# Patient Record
Sex: MALE | Race: WHITE | NOT HISPANIC OR LATINO | URBAN - METROPOLITAN AREA
[De-identification: names, ages, dates, MRNs, and addresses within clinical notes are randomized per-mention and may not be internally consistent; named-entity substitution may affect disease eponyms.]

---

## 2023-06-18 ENCOUNTER — EMERGENCY (EMERGENCY)
Facility: HOSPITAL | Age: 38
LOS: 0 days | Discharge: HOME | End: 2023-06-18
Attending: EMERGENCY MEDICINE
Payer: COMMERCIAL

## 2023-06-18 VITALS
RESPIRATION RATE: 18 BRPM | SYSTOLIC BLOOD PRESSURE: 129 MMHG | HEART RATE: 80 BPM | TEMPERATURE: 97 F | OXYGEN SATURATION: 98 % | DIASTOLIC BLOOD PRESSURE: 63 MMHG

## 2023-06-18 VITALS
OXYGEN SATURATION: 98 % | WEIGHT: 199.96 LBS | SYSTOLIC BLOOD PRESSURE: 132 MMHG | HEART RATE: 84 BPM | RESPIRATION RATE: 18 BRPM | HEIGHT: 71 IN | TEMPERATURE: 97 F | DIASTOLIC BLOOD PRESSURE: 64 MMHG

## 2023-06-18 DIAGNOSIS — R55 SYNCOPE AND COLLAPSE: ICD-10-CM

## 2023-06-18 DIAGNOSIS — R10.9 UNSPECIFIED ABDOMINAL PAIN: ICD-10-CM

## 2023-06-18 DIAGNOSIS — N50.812 LEFT TESTICULAR PAIN: ICD-10-CM

## 2023-06-18 LAB
ALBUMIN SERPL ELPH-MCNC: 4.7 G/DL — SIGNIFICANT CHANGE UP (ref 3.5–5.2)
ALP SERPL-CCNC: 62 U/L — SIGNIFICANT CHANGE UP (ref 30–115)
ALT FLD-CCNC: 14 U/L — SIGNIFICANT CHANGE UP (ref 0–41)
ANION GAP SERPL CALC-SCNC: 9 MMOL/L — SIGNIFICANT CHANGE UP (ref 7–14)
APPEARANCE UR: CLEAR — SIGNIFICANT CHANGE UP
AST SERPL-CCNC: 19 U/L — SIGNIFICANT CHANGE UP (ref 0–41)
BASOPHILS # BLD AUTO: 0.01 K/UL — SIGNIFICANT CHANGE UP (ref 0–0.2)
BASOPHILS NFR BLD AUTO: 0.2 % — SIGNIFICANT CHANGE UP (ref 0–1)
BILIRUB SERPL-MCNC: 0.7 MG/DL — SIGNIFICANT CHANGE UP (ref 0.2–1.2)
BILIRUB UR-MCNC: NEGATIVE — SIGNIFICANT CHANGE UP
BUN SERPL-MCNC: 10 MG/DL — SIGNIFICANT CHANGE UP (ref 10–20)
CALCIUM SERPL-MCNC: 9.3 MG/DL — SIGNIFICANT CHANGE UP (ref 8.4–10.5)
CHLORIDE SERPL-SCNC: 103 MMOL/L — SIGNIFICANT CHANGE UP (ref 98–110)
CO2 SERPL-SCNC: 26 MMOL/L — SIGNIFICANT CHANGE UP (ref 17–32)
COLOR SPEC: YELLOW — SIGNIFICANT CHANGE UP
CREAT SERPL-MCNC: 0.9 MG/DL — SIGNIFICANT CHANGE UP (ref 0.7–1.5)
DIFF PNL FLD: NEGATIVE — SIGNIFICANT CHANGE UP
EGFR: 113 ML/MIN/1.73M2 — SIGNIFICANT CHANGE UP
EOSINOPHIL # BLD AUTO: 0.19 K/UL — SIGNIFICANT CHANGE UP (ref 0–0.7)
EOSINOPHIL NFR BLD AUTO: 3.1 % — SIGNIFICANT CHANGE UP (ref 0–8)
GLUCOSE SERPL-MCNC: 98 MG/DL — SIGNIFICANT CHANGE UP (ref 70–99)
GLUCOSE UR QL: NEGATIVE MG/DL — SIGNIFICANT CHANGE UP
HCT VFR BLD CALC: 44.1 % — SIGNIFICANT CHANGE UP (ref 42–52)
HGB BLD-MCNC: 15.4 G/DL — SIGNIFICANT CHANGE UP (ref 14–18)
IMM GRANULOCYTES NFR BLD AUTO: 0.3 % — SIGNIFICANT CHANGE UP (ref 0.1–0.3)
KETONES UR-MCNC: NEGATIVE — SIGNIFICANT CHANGE UP
LEUKOCYTE ESTERASE UR-ACNC: NEGATIVE — SIGNIFICANT CHANGE UP
LIDOCAIN IGE QN: 30 U/L — SIGNIFICANT CHANGE UP (ref 7–60)
LYMPHOCYTES # BLD AUTO: 1.01 K/UL — LOW (ref 1.2–3.4)
LYMPHOCYTES # BLD AUTO: 16.5 % — LOW (ref 20.5–51.1)
MCHC RBC-ENTMCNC: 31.6 PG — HIGH (ref 27–31)
MCHC RBC-ENTMCNC: 34.9 G/DL — SIGNIFICANT CHANGE UP (ref 32–37)
MCV RBC AUTO: 90.4 FL — SIGNIFICANT CHANGE UP (ref 80–94)
MONOCYTES # BLD AUTO: 0.52 K/UL — SIGNIFICANT CHANGE UP (ref 0.1–0.6)
MONOCYTES NFR BLD AUTO: 8.5 % — SIGNIFICANT CHANGE UP (ref 1.7–9.3)
NEUTROPHILS # BLD AUTO: 4.36 K/UL — SIGNIFICANT CHANGE UP (ref 1.4–6.5)
NEUTROPHILS NFR BLD AUTO: 71.4 % — SIGNIFICANT CHANGE UP (ref 42.2–75.2)
NITRITE UR-MCNC: NEGATIVE — SIGNIFICANT CHANGE UP
NRBC # BLD: 0 /100 WBCS — SIGNIFICANT CHANGE UP (ref 0–0)
PH UR: 6.5 — SIGNIFICANT CHANGE UP (ref 5–8)
PLATELET # BLD AUTO: 250 K/UL — SIGNIFICANT CHANGE UP (ref 130–400)
PMV BLD: 8.4 FL — SIGNIFICANT CHANGE UP (ref 7.4–10.4)
POTASSIUM SERPL-MCNC: 4.3 MMOL/L — SIGNIFICANT CHANGE UP (ref 3.5–5)
POTASSIUM SERPL-SCNC: 4.3 MMOL/L — SIGNIFICANT CHANGE UP (ref 3.5–5)
PROT SERPL-MCNC: 7.1 G/DL — SIGNIFICANT CHANGE UP (ref 6–8)
PROT UR-MCNC: NEGATIVE MG/DL — SIGNIFICANT CHANGE UP
RBC # BLD: 4.88 M/UL — SIGNIFICANT CHANGE UP (ref 4.7–6.1)
RBC # FLD: 12 % — SIGNIFICANT CHANGE UP (ref 11.5–14.5)
SODIUM SERPL-SCNC: 138 MMOL/L — SIGNIFICANT CHANGE UP (ref 135–146)
SP GR SPEC: 1.01 — SIGNIFICANT CHANGE UP (ref 1.01–1.03)
UROBILINOGEN FLD QL: 0.2 MG/DL — SIGNIFICANT CHANGE UP
WBC # BLD: 6.11 K/UL — SIGNIFICANT CHANGE UP (ref 4.8–10.8)
WBC # FLD AUTO: 6.11 K/UL — SIGNIFICANT CHANGE UP (ref 4.8–10.8)

## 2023-06-18 PROCEDURE — 76870 US EXAM SCROTUM: CPT

## 2023-06-18 PROCEDURE — 74177 CT ABD & PELVIS W/CONTRAST: CPT | Mod: MA

## 2023-06-18 PROCEDURE — 93010 ELECTROCARDIOGRAM REPORT: CPT

## 2023-06-18 PROCEDURE — 87086 URINE CULTURE/COLONY COUNT: CPT

## 2023-06-18 PROCEDURE — 74177 CT ABD & PELVIS W/CONTRAST: CPT | Mod: 26,MA

## 2023-06-18 PROCEDURE — 93005 ELECTROCARDIOGRAM TRACING: CPT

## 2023-06-18 PROCEDURE — 36415 COLL VENOUS BLD VENIPUNCTURE: CPT

## 2023-06-18 PROCEDURE — 85025 COMPLETE CBC W/AUTO DIFF WBC: CPT

## 2023-06-18 PROCEDURE — 99285 EMERGENCY DEPT VISIT HI MDM: CPT

## 2023-06-18 PROCEDURE — 76870 US EXAM SCROTUM: CPT | Mod: 26

## 2023-06-18 PROCEDURE — 99285 EMERGENCY DEPT VISIT HI MDM: CPT | Mod: 25

## 2023-06-18 PROCEDURE — 81003 URINALYSIS AUTO W/O SCOPE: CPT

## 2023-06-18 PROCEDURE — 83690 ASSAY OF LIPASE: CPT

## 2023-06-18 PROCEDURE — 80053 COMPREHEN METABOLIC PANEL: CPT

## 2023-06-18 RX ORDER — SODIUM CHLORIDE 9 MG/ML
1000 INJECTION INTRAMUSCULAR; INTRAVENOUS; SUBCUTANEOUS ONCE
Refills: 0 | Status: COMPLETED | OUTPATIENT
Start: 2023-06-18 | End: 2023-06-18

## 2023-06-18 RX ADMIN — SODIUM CHLORIDE 2000 MILLILITER(S): 9 INJECTION INTRAMUSCULAR; INTRAVENOUS; SUBCUTANEOUS at 14:29

## 2023-06-18 NOTE — ED PROVIDER NOTE - PROGRESS NOTE DETAILS
Labs unremarkable.  UA shows no evidence of UTI.  CT and ultrasound normal; has been made aware of all incidental findings.  We will have patient follow-up with cardiology and GI.  Patient is stable for discharge.

## 2023-06-18 NOTE — ED PROVIDER NOTE - PATIENT PORTAL LINK FT
You can access the FollowMyHealth Patient Portal offered by [facility] by registering at the following website: [email or web address]. By joining [employer]’s FollowMyHealth portal, you will also be able to view your health information using other applications (apps) compatible with our system.

## 2023-06-18 NOTE — ED PROVIDER NOTE - CLINICAL SUMMARY MEDICAL DECISION MAKING FREE TEXT BOX
Pt presents with intermittent left sided abdominal pain. CT scan and testicular Ultrasound are normal.

## 2023-06-18 NOTE — ED PROVIDER NOTE - PHYSICAL EXAMINATION
CONSTITUTIONAL: in no apparent distress.   HEAD: Normocephalic; atraumatic.   EYES: Pupils are round and reactive, extra-ocular muscles are intact. Eyelids are normal in appearance without swelling or lesions.   ENT: Hearing is intact with good acuity to spoken voice.  Patient is speaking clearly, not muffled and airway is intact.   RESPIRATORY: No signs of respiratory distress. Lung sounds are clear in all lobes bilaterally without rales, rhonchi, or wheezes.  CARDIOVASCULAR: Regular rate and rhythm.   GI: tender to palpation in the LLQ. Abdomen is soft,, and without distention. Bowel sounds are present and normoactive in all four quadrants. No masses are noted.   BACK: No evidence of trauma or deformity. No midline tenderness. No CVA tenderness bilaterally. Normal ROM.     GENITALIA: Chaperone: ***. No penile discharge or lesions. No scrotal swelling or discoloration. Testes descended bilaterally, smooth, without masses.    NEURO: A & O x 3. Normal speech. No focal deficit.  PSYCHOLOGICAL: Appropriate mood and affect. Good judgement and insight. CONSTITUTIONAL: in no apparent distress.   HEAD: Normocephalic; atraumatic.   EYES: Pupils are round and reactive, extra-ocular muscles are intact. Eyelids are normal in appearance without swelling or lesions.   ENT: Hearing is intact with good acuity to spoken voice.  Patient is speaking clearly, not muffled and airway is intact.   RESPIRATORY: No signs of respiratory distress. Lung sounds are clear in all lobes bilaterally without rales, rhonchi, or wheezes.  CARDIOVASCULAR: Regular rate and rhythm.   GI: tender to palpation in the LLQ. Abdomen is soft,, and without distention. Bowel sounds are present and normoactive in all four quadrants. No masses are noted.   BACK: No evidence of trauma or deformity. No midline tenderness. No CVA tenderness bilaterally. Normal ROM.   GENITALIA: Chaperone: Dr. Ruiz. No penile discharge or lesions. No scrotal swelling or discoloration. Testes descended bilaterally, smooth, without masses.  NEURO: A & O x 3. Normal speech. No focal deficit.  PSYCHOLOGICAL: Appropriate mood and affect. Good judgement and insight.

## 2023-06-18 NOTE — ED ADULT NURSE NOTE - NSFALLUNIVINTERV_ED_ALL_ED
Bed/Stretcher in lowest position, wheels locked, appropriate side rails in place/Call bell, personal items and telephone in reach/Instruct patient to call for assistance before getting out of bed/chair/stretcher/Non-slip footwear applied when patient is off stretcher/[place] to call system/Physically safe environment - no spills, clutter or unnecessary equipment/Purposeful proactive rounding/Room/bathroom lighting operational, light cord in reach

## 2023-06-18 NOTE — ED PROVIDER NOTE - ATTENDING APP SHARED VISIT CONTRIBUTION OF CARE
Pt reports he had sudden pain to the left lower abdomen one week ago which caused him to pass out. No back pain. Pt reports pain reoccurred today. Also felt pain to the testicle. On exam S1S2 rrr, no murmur, lungs clear, abdomen is soft nontender, ext neg for edema. No rash. Testicular exam is normal. no hernia.

## 2023-06-18 NOTE — ED PROVIDER NOTE - CARE PROVIDER_API CALL
Alden Vidales Y  Interventional Cardiology  [address] [place] [address], [address]  [place] [address]  Phone: [phone]  Fax: [phone]  Follow Up Time: 1-3 Days    Omaira Hollingsworth  Gastroenterology  [address] [place] [id number]  Phone: [phone]  Fax: [phone]  Follow Up Time: 1-3 Days

## 2023-06-18 NOTE — ED PROVIDER NOTE - OBJECTIVE STATEMENT
38-year-old male with no significant past medical history who presents with left groin pain.  Reports that he had a 1 episode of this symptom about a week ago while he was standing; states that he noticed a sharp pain in his left testicle radiating into his left lower quadrant and syncopized for 1 minute.  He denies loss of bladder/bowel control, history of seizure activity, and tongue biting.  Reports that he was asymptomatic until yesterday; reports that he was driving back home and noticed a sharp pain again in the same area, so decided to come to get checked out today.  Denies headache, lightheadedness, dizziness, fever, chest pain, shortness of breath, nausea, vomiting, hematuria, dysuria, and melena/hematochezia. Denies family hx of members passed away at young age for unknown reason.

## 2023-06-18 NOTE — ED PROVIDER NOTE - PROVIDER TOKENS
PROVIDER:[TOKEN:[[id number]],FOLLOWUP:[1-3 Days]],PROVIDER:[TOKEN:[[id number]],FOLLOWUP:[1-3 Days]]

## 2023-06-19 LAB
CULTURE RESULTS: SIGNIFICANT CHANGE UP
SPECIMEN SOURCE: SIGNIFICANT CHANGE UP

## 2023-06-22 ENCOUNTER — EMERGENCY (EMERGENCY)
Facility: HOSPITAL | Age: 38
LOS: 0 days | Discharge: HOME | End: 2023-06-22
Attending: EMERGENCY MEDICINE
Payer: COMMERCIAL

## 2023-06-22 VITALS
RESPIRATION RATE: 18 BRPM | HEIGHT: 71 IN | DIASTOLIC BLOOD PRESSURE: 72 MMHG | OXYGEN SATURATION: 98 % | WEIGHT: 207.9 LBS | TEMPERATURE: 97 F | SYSTOLIC BLOOD PRESSURE: 135 MMHG | HEART RATE: 97 BPM

## 2023-06-22 VITALS
DIASTOLIC BLOOD PRESSURE: 74 MMHG | OXYGEN SATURATION: 99 % | HEART RATE: 89 BPM | SYSTOLIC BLOOD PRESSURE: 136 MMHG | RESPIRATION RATE: 18 BRPM

## 2023-06-22 DIAGNOSIS — R10.9 UNSPECIFIED ABDOMINAL PAIN: ICD-10-CM

## 2023-06-22 PROBLEM — Z78.9 OTHER SPECIFIED HEALTH STATUS: Chronic | Status: ACTIVE | Noted: 2023-06-18

## 2023-06-22 LAB
ALBUMIN SERPL ELPH-MCNC: 4.9 G/DL — SIGNIFICANT CHANGE UP (ref 3.5–5.2)
ALP SERPL-CCNC: 60 U/L — SIGNIFICANT CHANGE UP (ref 30–115)
ALT FLD-CCNC: 15 U/L — SIGNIFICANT CHANGE UP (ref 0–41)
ANION GAP SERPL CALC-SCNC: 12 MMOL/L — SIGNIFICANT CHANGE UP (ref 7–14)
AST SERPL-CCNC: 17 U/L — SIGNIFICANT CHANGE UP (ref 0–41)
BASOPHILS # BLD AUTO: 0.02 K/UL — SIGNIFICANT CHANGE UP (ref 0–0.2)
BASOPHILS NFR BLD AUTO: 0.4 % — SIGNIFICANT CHANGE UP (ref 0–1)
BILIRUB SERPL-MCNC: 0.7 MG/DL — SIGNIFICANT CHANGE UP (ref 0.2–1.2)
BUN SERPL-MCNC: 10 MG/DL — SIGNIFICANT CHANGE UP (ref 10–20)
CALCIUM SERPL-MCNC: 9.5 MG/DL — SIGNIFICANT CHANGE UP (ref 8.4–10.5)
CHLORIDE SERPL-SCNC: 105 MMOL/L — SIGNIFICANT CHANGE UP (ref 98–110)
CO2 SERPL-SCNC: 26 MMOL/L — SIGNIFICANT CHANGE UP (ref 17–32)
CREAT SERPL-MCNC: 0.8 MG/DL — SIGNIFICANT CHANGE UP (ref 0.7–1.5)
EGFR: 117 ML/MIN/1.73M2 — SIGNIFICANT CHANGE UP
EOSINOPHIL # BLD AUTO: 0.15 K/UL — SIGNIFICANT CHANGE UP (ref 0–0.7)
EOSINOPHIL NFR BLD AUTO: 2.7 % — SIGNIFICANT CHANGE UP (ref 0–8)
GLUCOSE SERPL-MCNC: 91 MG/DL — SIGNIFICANT CHANGE UP (ref 70–99)
HCT VFR BLD CALC: 44 % — SIGNIFICANT CHANGE UP (ref 42–52)
HGB BLD-MCNC: 15.7 G/DL — SIGNIFICANT CHANGE UP (ref 14–18)
IMM GRANULOCYTES NFR BLD AUTO: 0.2 % — SIGNIFICANT CHANGE UP (ref 0.1–0.3)
LIDOCAIN IGE QN: 28 U/L — SIGNIFICANT CHANGE UP (ref 7–60)
LYMPHOCYTES # BLD AUTO: 1.07 K/UL — LOW (ref 1.2–3.4)
LYMPHOCYTES # BLD AUTO: 19.1 % — LOW (ref 20.5–51.1)
MCHC RBC-ENTMCNC: 31.4 PG — HIGH (ref 27–31)
MCHC RBC-ENTMCNC: 35.7 G/DL — SIGNIFICANT CHANGE UP (ref 32–37)
MCV RBC AUTO: 88 FL — SIGNIFICANT CHANGE UP (ref 80–94)
MONOCYTES # BLD AUTO: 0.53 K/UL — SIGNIFICANT CHANGE UP (ref 0.1–0.6)
MONOCYTES NFR BLD AUTO: 9.4 % — HIGH (ref 1.7–9.3)
NEUTROPHILS # BLD AUTO: 3.83 K/UL — SIGNIFICANT CHANGE UP (ref 1.4–6.5)
NEUTROPHILS NFR BLD AUTO: 68.2 % — SIGNIFICANT CHANGE UP (ref 42.2–75.2)
NRBC # BLD: 0 /100 WBCS — SIGNIFICANT CHANGE UP (ref 0–0)
PLATELET # BLD AUTO: 256 K/UL — SIGNIFICANT CHANGE UP (ref 130–400)
PMV BLD: 8.5 FL — SIGNIFICANT CHANGE UP (ref 7.4–10.4)
POTASSIUM SERPL-MCNC: 4.2 MMOL/L — SIGNIFICANT CHANGE UP (ref 3.5–5)
POTASSIUM SERPL-SCNC: 4.2 MMOL/L — SIGNIFICANT CHANGE UP (ref 3.5–5)
PROT SERPL-MCNC: 6.8 G/DL — SIGNIFICANT CHANGE UP (ref 6–8)
RBC # BLD: 5 M/UL — SIGNIFICANT CHANGE UP (ref 4.7–6.1)
RBC # FLD: 11.9 % — SIGNIFICANT CHANGE UP (ref 11.5–14.5)
SODIUM SERPL-SCNC: 143 MMOL/L — SIGNIFICANT CHANGE UP (ref 135–146)
WBC # BLD: 5.61 K/UL — SIGNIFICANT CHANGE UP (ref 4.8–10.8)
WBC # FLD AUTO: 5.61 K/UL — SIGNIFICANT CHANGE UP (ref 4.8–10.8)

## 2023-06-22 PROCEDURE — 76705 ECHO EXAM OF ABDOMEN: CPT | Mod: 26

## 2023-06-22 PROCEDURE — 96374 THER/PROPH/DIAG INJ IV PUSH: CPT

## 2023-06-22 PROCEDURE — 99284 EMERGENCY DEPT VISIT MOD MDM: CPT | Mod: 25

## 2023-06-22 PROCEDURE — 83690 ASSAY OF LIPASE: CPT

## 2023-06-22 PROCEDURE — 99284 EMERGENCY DEPT VISIT MOD MDM: CPT

## 2023-06-22 PROCEDURE — 36415 COLL VENOUS BLD VENIPUNCTURE: CPT

## 2023-06-22 PROCEDURE — 80053 COMPREHEN METABOLIC PANEL: CPT

## 2023-06-22 PROCEDURE — 76705 ECHO EXAM OF ABDOMEN: CPT

## 2023-06-22 PROCEDURE — 85025 COMPLETE CBC W/AUTO DIFF WBC: CPT

## 2023-06-22 RX ORDER — SODIUM CHLORIDE 9 MG/ML
1000 INJECTION INTRAMUSCULAR; INTRAVENOUS; SUBCUTANEOUS ONCE
Refills: 0 | Status: COMPLETED | OUTPATIENT
Start: 2023-06-22 | End: 2023-06-22

## 2023-06-22 RX ORDER — ONDANSETRON 8 MG/1
4 TABLET, FILM COATED ORAL ONCE
Refills: 0 | Status: COMPLETED | OUTPATIENT
Start: 2023-06-22 | End: 2023-06-22

## 2023-06-22 RX ADMIN — ONDANSETRON 4 MILLIGRAM(S): 8 TABLET, FILM COATED ORAL at 14:47

## 2023-06-22 RX ADMIN — SODIUM CHLORIDE 1000 MILLILITER(S): 9 INJECTION INTRAMUSCULAR; INTRAVENOUS; SUBCUTANEOUS at 14:47

## 2023-06-22 NOTE — ED PROVIDER NOTE - PATIENT PORTAL LINK FT
You can access the FollowMyHealth Patient Portal offered by [facility] by registering at the following website: [email or web address]. By joining [employer]’s FollowMyHealth portal, you will also be able to view your health information using other applications (apps) compatible with our system.

## 2023-06-22 NOTE — ED ADULT NURSE NOTE - NSFALLUNIVINTERV_ED_ALL_ED
Bed/Stretcher in lowest position, wheels locked, appropriate side rails in place/Call bell, personal items and telephone in reach/Instruct patient to call for assistance before getting out of bed/chair/stretcher/Non-slip footwear applied when patient is off stretcher/[place] to call system/Physically safe environment - no spills, clutter or unnecessary equipment/Purposeful proactive rounding/Room/bathroom lighting operational, light cord in reach

## 2023-06-22 NOTE — ED PROVIDER NOTE - PHYSICAL EXAMINATION
CONSTITUTIONAL: NAD  SKIN: Warm dry  HEAD: NCAT  EYES: NL inspection  ENT: MMM  NECK: Supple; non tender.  CARD: RRR  RESP: CTAB  ABD: Soft, minimally tender in central abd and RUQ,  pos murphys on palpation  EXT: no pedal edema  NEURO: Grossly unremarkable  PSYCH: Cooperative, appropriate.

## 2023-06-22 NOTE — ED PROVIDER NOTE - OBJECTIVE STATEMENT
38-year-old male coming with complaints of abdominal pain.  Patient began to have significant abdominal pain for the past week and a half, was in ED 4 days ago, had scrotal and CT abdomen pelvis negative.  Since then still has significant abdominal pain, feels like a tight waistband.  Went to PCP who advised him to come in for a ultrasound to rule out gallstones. Denies any fever, chills, nausea, vomiting, CP, SOB, changes in urination, or changes in bowel movements.

## 2023-06-22 NOTE — ED PROVIDER NOTE - NSFOLLOWUPINSTRUCTIONS_ED_ALL_ED_FT
Please follow-up with PCP in 1 to 3 days. Return precautions explained in full to patient/family.    Abdominal Pain    You were seen and evaluated for abdominal pain. After being worked up in the Emergency Department, it has been determined that it is safe for you to be discharged with proper medication and follow up. Please take medications as prescribed and bring all of your results to your follow up. As discussed, you may require further work up and testing for your symptoms.    WHAT YOU NEED TO KNOW:  Abdominal pain can be dull, achy, or sharp. You may have pain in one area of your abdomen, or in your entire abdomen. Your pain may be caused by a condition such as constipation, food sensitivity or poisoning, infection, or a blockage. You were screened for any seirous causes of pain here in the Emergency Department. You may require further work up by a stomach doctor specialist (Gastroenterologist). Abdominal pain can also be from a hernia, appendicitis, or an ulcer. Liver, gallbladder, or kidney conditions can also cause abdominal pain. The cause of your abdominal pain may not be known.        DISCHARGE INSTRUCTIONS:  Call your local emergency number (911 in the [place]) if:  You have new chest pain or shortness of breath.  Return to the emergency department if:  You have pulsing pain in your upper abdomen or lower back that suddenly becomes constant.  Your pain is in the right lower abdominal area and worsens with movement.  You have a fever over 100.4°F (38°C) or shaking chills.  You are vomiting and cannot keep food or liquids down.  Your pain does not improve or gets worse over the next 8 to 12 hours.  You see blood in your vomit or bowel movements, or they look black and tarry.  Your skin or the whites of your eyes turn yellow.  You are a woman and have a large amount of vaginal bleeding that is not your monthly period.  Call your doctor if:  You have pain in your lower back.  You are a man and have pain in your testicles.  You have pain when you urinate.  You have questions or concerns about your condition or care.  Medicines:  Prescription pain medicine may be given. Ask your healthcare provider how to take this medicine safely. Some prescription pain medicines contain acetaminophen. Do not take other medicines that contain acetaminophen without talking to your healthcare provider. Too much acetaminophen may cause liver damage. Prescription pain medicine may cause constipation. Ask your healthcare provider how to prevent or treat constipation.  Medicines may be given to calm your stomach or prevent vomiting.  Take your medicine as directed. Contact your healthcare provider if you think your medicine is not helping or if you have side effects. Tell him of her if you are allergic to any medicine. Keep a list of the medicines, vitamins, and herbs you take. Include the amounts, and when and why you take them. Bring the list or the pill bottles to follow-up visits. Carry your medicine list with you in case of an emergency.

## 2023-06-22 NOTE — ED PROVIDER NOTE - ATTENDING CONTRIBUTION TO CARE
38-year-old male returns to emergency room for right upper quadrant sono, was seen recently for abdominal pain and negative scrotal ultrasound and CAT scan performed however to be tender by PMD, on exam vitals appreciated, well-appearing, abdomen NABS, soft with mild right abdominal wall TTP negative Roman's, labs and studies appreciated, will discharge patient supportive care, GI follow-up.  Patient counseled regarding conditions which should prompt return.

## 2023-06-22 NOTE — ED ADULT NURSE NOTE - CHIEF COMPLAINT QUOTE
Pt states " I have midline abdominal pain over the past week but its been more consistent over the past couple of hours."

## 2023-06-24 ENCOUNTER — INPATIENT (INPATIENT)
Facility: HOSPITAL | Age: 38
LOS: 1 days | Discharge: HOME | DRG: 74 | End: 2023-06-26
Attending: INTERNAL MEDICINE | Admitting: STUDENT IN AN ORGANIZED HEALTH CARE EDUCATION/TRAINING PROGRAM
Payer: COMMERCIAL

## 2023-06-24 VITALS
HEART RATE: 74 BPM | WEIGHT: 207.9 LBS | DIASTOLIC BLOOD PRESSURE: 83 MMHG | HEIGHT: 71 IN | OXYGEN SATURATION: 99 % | TEMPERATURE: 98 F | RESPIRATION RATE: 16 BRPM | SYSTOLIC BLOOD PRESSURE: 145 MMHG

## 2023-06-24 DIAGNOSIS — R55 SYNCOPE AND COLLAPSE: ICD-10-CM

## 2023-06-24 LAB
ALBUMIN SERPL ELPH-MCNC: 4.7 G/DL — SIGNIFICANT CHANGE UP (ref 3.5–5.2)
ALP SERPL-CCNC: 65 U/L — SIGNIFICANT CHANGE UP (ref 30–115)
ALT FLD-CCNC: 18 U/L — SIGNIFICANT CHANGE UP (ref 0–41)
ANION GAP SERPL CALC-SCNC: 12 MMOL/L — SIGNIFICANT CHANGE UP (ref 7–14)
ANISOCYTOSIS BLD QL: SIGNIFICANT CHANGE UP
APPEARANCE UR: CLEAR — SIGNIFICANT CHANGE UP
AST SERPL-CCNC: 17 U/L — SIGNIFICANT CHANGE UP (ref 0–41)
BASOPHILS # BLD AUTO: 0.06 K/UL — SIGNIFICANT CHANGE UP (ref 0–0.2)
BASOPHILS NFR BLD AUTO: 0.8 % — SIGNIFICANT CHANGE UP (ref 0–1)
BILIRUB SERPL-MCNC: 1 MG/DL — SIGNIFICANT CHANGE UP (ref 0.2–1.2)
BILIRUB UR-MCNC: NEGATIVE — SIGNIFICANT CHANGE UP
BUN SERPL-MCNC: 10 MG/DL — SIGNIFICANT CHANGE UP (ref 10–20)
CALCIUM SERPL-MCNC: 9.6 MG/DL — SIGNIFICANT CHANGE UP (ref 8.4–10.5)
CHLORIDE SERPL-SCNC: 104 MMOL/L — SIGNIFICANT CHANGE UP (ref 98–110)
CK SERPL-CCNC: 84 U/L — SIGNIFICANT CHANGE UP (ref 0–225)
CO2 SERPL-SCNC: 25 MMOL/L — SIGNIFICANT CHANGE UP (ref 17–32)
COLOR SPEC: COLORLESS — SIGNIFICANT CHANGE UP
CREAT SERPL-MCNC: 0.9 MG/DL — SIGNIFICANT CHANGE UP (ref 0.7–1.5)
DIFF PNL FLD: NEGATIVE — SIGNIFICANT CHANGE UP
EGFR: 113 ML/MIN/1.73M2 — SIGNIFICANT CHANGE UP
EOSINOPHIL # BLD AUTO: 0.37 K/UL — SIGNIFICANT CHANGE UP (ref 0–0.7)
EOSINOPHIL NFR BLD AUTO: 5.3 % — SIGNIFICANT CHANGE UP (ref 0–8)
GLUCOSE SERPL-MCNC: 91 MG/DL — SIGNIFICANT CHANGE UP (ref 70–99)
GLUCOSE UR QL: NEGATIVE — SIGNIFICANT CHANGE UP
HCT VFR BLD CALC: 42.4 % — SIGNIFICANT CHANGE UP (ref 42–52)
HGB BLD-MCNC: 15.1 G/DL — SIGNIFICANT CHANGE UP (ref 14–18)
KETONES UR-MCNC: NEGATIVE — SIGNIFICANT CHANGE UP
LEUKOCYTE ESTERASE UR-ACNC: NEGATIVE — SIGNIFICANT CHANGE UP
LYMPHOCYTES # BLD AUTO: 2.73 K/UL — SIGNIFICANT CHANGE UP (ref 1.2–3.4)
LYMPHOCYTES # BLD AUTO: 38.9 % — SIGNIFICANT CHANGE UP (ref 20.5–51.1)
MAGNESIUM SERPL-MCNC: 1.9 MG/DL — SIGNIFICANT CHANGE UP (ref 1.8–2.4)
MANUAL SMEAR VERIFICATION: SIGNIFICANT CHANGE UP
MCHC RBC-ENTMCNC: 32.1 PG — HIGH (ref 27–31)
MCHC RBC-ENTMCNC: 35.6 G/DL — SIGNIFICANT CHANGE UP (ref 32–37)
MCV RBC AUTO: 90 FL — SIGNIFICANT CHANGE UP (ref 80–94)
MICROCYTES BLD QL: SIGNIFICANT CHANGE UP
MONOCYTES # BLD AUTO: 0.11 K/UL — SIGNIFICANT CHANGE UP (ref 0.1–0.6)
MONOCYTES NFR BLD AUTO: 1.5 % — LOW (ref 1.7–9.3)
MYELOCYTES NFR BLD: 0.8 % — HIGH (ref 0–0)
NEUTROPHILS # BLD AUTO: 3.65 K/UL — SIGNIFICANT CHANGE UP (ref 1.4–6.5)
NEUTROPHILS NFR BLD AUTO: 51.9 % — SIGNIFICANT CHANGE UP (ref 42.2–75.2)
NITRITE UR-MCNC: NEGATIVE — SIGNIFICANT CHANGE UP
OVALOCYTES BLD QL SMEAR: SLIGHT — SIGNIFICANT CHANGE UP
PH UR: 6.5 — SIGNIFICANT CHANGE UP (ref 5–8)
PLAT MORPH BLD: NORMAL — SIGNIFICANT CHANGE UP
PLATELET # BLD AUTO: 246 K/UL — SIGNIFICANT CHANGE UP (ref 130–400)
PMV BLD: 8.6 FL — SIGNIFICANT CHANGE UP (ref 7.4–10.4)
POIKILOCYTOSIS BLD QL AUTO: SLIGHT — SIGNIFICANT CHANGE UP
POLYCHROMASIA BLD QL SMEAR: SLIGHT — SIGNIFICANT CHANGE UP
POTASSIUM SERPL-MCNC: 3.9 MMOL/L — SIGNIFICANT CHANGE UP (ref 3.5–5)
POTASSIUM SERPL-SCNC: 3.9 MMOL/L — SIGNIFICANT CHANGE UP (ref 3.5–5)
PROT SERPL-MCNC: 6.9 G/DL — SIGNIFICANT CHANGE UP (ref 6–8)
PROT UR-MCNC: NEGATIVE — SIGNIFICANT CHANGE UP
RBC # BLD: 4.71 M/UL — SIGNIFICANT CHANGE UP (ref 4.7–6.1)
RBC # FLD: 11.9 % — SIGNIFICANT CHANGE UP (ref 11.5–14.5)
RBC BLD AUTO: NORMAL — SIGNIFICANT CHANGE UP
SODIUM SERPL-SCNC: 141 MMOL/L — SIGNIFICANT CHANGE UP (ref 135–146)
SP GR SPEC: 1 — LOW (ref 1.01–1.03)
TROPONIN T SERPL-MCNC: <0.01 NG/ML — SIGNIFICANT CHANGE UP
UROBILINOGEN FLD QL: SIGNIFICANT CHANGE UP
VARIANT LYMPHS # BLD: 0.8 % — SIGNIFICANT CHANGE UP (ref 0–5)
WBC # BLD: 7.03 K/UL — SIGNIFICANT CHANGE UP (ref 4.8–10.8)
WBC # FLD AUTO: 7.03 K/UL — SIGNIFICANT CHANGE UP (ref 4.8–10.8)

## 2023-06-24 PROCEDURE — 82607 VITAMIN B-12: CPT

## 2023-06-24 PROCEDURE — 85025 COMPLETE CBC W/AUTO DIFF WBC: CPT

## 2023-06-24 PROCEDURE — 70450 CT HEAD/BRAIN W/O DYE: CPT | Mod: 26,MA

## 2023-06-24 PROCEDURE — 74174 CTA ABD&PLVS W/CONTRAST: CPT

## 2023-06-24 PROCEDURE — 72158 MRI LUMBAR SPINE W/O & W/DYE: CPT

## 2023-06-24 PROCEDURE — 80053 COMPREHEN METABOLIC PANEL: CPT

## 2023-06-24 PROCEDURE — 82746 ASSAY OF FOLIC ACID SERUM: CPT

## 2023-06-24 PROCEDURE — 85652 RBC SED RATE AUTOMATED: CPT

## 2023-06-24 PROCEDURE — 71045 X-RAY EXAM CHEST 1 VIEW: CPT | Mod: 26

## 2023-06-24 PROCEDURE — 84311 SPECTROPHOTOMETRY: CPT

## 2023-06-24 PROCEDURE — 80307 DRUG TEST PRSMV CHEM ANLYZR: CPT

## 2023-06-24 PROCEDURE — A9579: CPT

## 2023-06-24 PROCEDURE — 83036 HEMOGLOBIN GLYCOSYLATED A1C: CPT

## 2023-06-24 PROCEDURE — 71275 CT ANGIOGRAPHY CHEST: CPT

## 2023-06-24 PROCEDURE — 93010 ELECTROCARDIOGRAM REPORT: CPT

## 2023-06-24 PROCEDURE — 36415 COLL VENOUS BLD VENIPUNCTURE: CPT

## 2023-06-24 PROCEDURE — 99285 EMERGENCY DEPT VISIT HI MDM: CPT

## 2023-06-24 PROCEDURE — 72157 MRI CHEST SPINE W/O & W/DYE: CPT

## 2023-06-24 PROCEDURE — 86140 C-REACTIVE PROTEIN: CPT

## 2023-06-24 PROCEDURE — 86038 ANTINUCLEAR ANTIBODIES: CPT

## 2023-06-24 RX ORDER — METOCLOPRAMIDE HCL 10 MG
10 TABLET ORAL ONCE
Refills: 0 | Status: COMPLETED | OUTPATIENT
Start: 2023-06-24 | End: 2023-06-24

## 2023-06-24 RX ORDER — SODIUM CHLORIDE 9 MG/ML
1000 INJECTION, SOLUTION INTRAVENOUS ONCE
Refills: 0 | Status: COMPLETED | OUTPATIENT
Start: 2023-06-24 | End: 2023-06-24

## 2023-06-24 RX ORDER — ACETAMINOPHEN 500 MG
975 TABLET ORAL ONCE
Refills: 0 | Status: COMPLETED | OUTPATIENT
Start: 2023-06-24 | End: 2023-06-24

## 2023-06-24 RX ADMIN — Medication 975 MILLIGRAM(S): at 21:27

## 2023-06-24 RX ADMIN — Medication 104 MILLIGRAM(S): at 21:26

## 2023-06-24 RX ADMIN — Medication 975 MILLIGRAM(S): at 23:12

## 2023-06-24 RX ADMIN — SODIUM CHLORIDE 1000 MILLILITER(S): 9 INJECTION, SOLUTION INTRAVENOUS at 20:01

## 2023-06-24 NOTE — ED PROVIDER NOTE - ATTENDING APP SHARED VISIT CONTRIBUTION OF CARE
Patient is c/o paresthesias of the lower ext and B/L hands x 2-3 wks, progressively getting worse. Patient with two ED visits for his symptoms. +syncope. +generalized weakness.   vitals reviewed.   Lungs: CTA, no wheezing, no crackles.  Heart: s1, s2, rrr, no M/G.  CNS: Awake, alert, o x 3, no focal neurologic deficits.   A/P: Patient with syncope, generalized weakness,   paresthesias,   labs, EKG, CXR,   reevaluation.

## 2023-06-24 NOTE — ED PROVIDER NOTE - DIFFERENTIAL DIAGNOSIS
Electrolyte abnormalities, dehydration, ALMA, hyperglycemia, hypoglycemia, symptomatic anemia.  R/o Guillain-Barré, r/o transverse myelitis. Differential Diagnosis

## 2023-06-24 NOTE — PATIENT PROFILE ADULT - FALL HARM RISK - HARM RISK INTERVENTIONS

## 2023-06-24 NOTE — ED ADULT NURSE NOTE - OBJECTIVE STATEMENT
Pt said he has been feeling crampy and woozy for 3 weeks, Erica been drinking a lot of water and Gatorade and I cant hydrate," C/o headaches and weakness. Pt reports his urine is clear. BGL 96 in triage.. Pt said he eats and drink a lot, but it feels is not enough. He is constantly craving for food, and feels cold faster. Since last weeks on 6/18/23, he is always on the couch feeling weak.

## 2023-06-24 NOTE — ED PROVIDER NOTE - PHYSICAL EXAMINATION
CONSTITUTIONAL: non-toxic appearing male, no acute distress  SKIN: skin exam is warm and dry  HEAD: Normocephalic; atraumatic  EYES: PERRL, 3 mm bilateral, no nystagmus, EOM intact; conjunctiva and sclera clear.  ENT: MMM  NECK: Supple; non tender.  ROM intact.  CARD: S1, S2 normal, no murmur  RESP: No increased WOB   ABD: soft; non-distended; non-tender  EXT: Normal ROM.   NEURO: awake, alert, following commands, oriented, grossly unremarkable. No Focal deficits. GCS 15.   PSYCH: Cooperative, appropriate.

## 2023-06-24 NOTE — ED ADULT NURSE NOTE - NSFALLUNIVINTERV_ED_ALL_ED
Bed/Stretcher in lowest position, wheels locked, appropriate side rails in place/Call bell, personal items and telephone in reach/Instruct patient to call for assistance before getting out of bed/chair/stretcher/Non-slip footwear applied when patient is off stretcher/[place] to call system/Physically safe environment - no spills, clutter or unnecessary equipment/Purposeful proactive rounding/Room/bathroom lighting operational, light cord in reach

## 2023-06-24 NOTE — ED PROVIDER NOTE - CLINICAL SUMMARY MEDICAL DECISION MAKING FREE TEXT BOX
Laboratory results reviewed and discussed with patient. CBC shows normal WBC count, Hb/Hct and plateelet count are WNL. BMP shows electrolytes WNL and no ALMA.  Chest X-rays reviewed by me, and shows no acute findings. No Pneumothorax, no free air, no effusions, and these findings discussed with patient.

## 2023-06-24 NOTE — ED ADULT TRIAGE NOTE - CHIEF COMPLAINT QUOTE
"Erica been feeling crampy and woozy for 3 weeks, Erica been drinking a lot of water and gatorade and I cant hydrate," C/o headaches and weakness. Pt reports his urine is clear. BGL 96 in triage.

## 2023-06-24 NOTE — ED PROVIDER NOTE - OBJECTIVE STATEMENT
38 year old male, no past medical history, who presents with generalized weakness. patient with recent eval @ [facility] x2 for similar. patient reports episodes of lightheadedness, diffuse cramping and near syncopal symptoms. patient also reports intermittent episodes of gradual onset of headache. denies f/c, vision changes, chest pain, hemoptysis, vomiting, syncope. no recent travel or surgery. no hx blood clots.

## 2023-06-25 PROCEDURE — 71275 CT ANGIOGRAPHY CHEST: CPT | Mod: 26

## 2023-06-25 PROCEDURE — 99222 1ST HOSP IP/OBS MODERATE 55: CPT

## 2023-06-25 PROCEDURE — 72157 MRI CHEST SPINE W/O & W/DYE: CPT | Mod: 26

## 2023-06-25 PROCEDURE — 99223 1ST HOSP IP/OBS HIGH 75: CPT

## 2023-06-25 PROCEDURE — 74174 CTA ABD&PLVS W/CONTRAST: CPT | Mod: 26

## 2023-06-25 PROCEDURE — 72158 MRI LUMBAR SPINE W/O & W/DYE: CPT | Mod: 26

## 2023-06-25 RX ORDER — LANOLIN ALCOHOL/MO/W.PET/CERES
3 CREAM (GRAM) TOPICAL AT BEDTIME
Refills: 0 | Status: DISCONTINUED | OUTPATIENT
Start: 2023-06-25 | End: 2023-06-26

## 2023-06-25 RX ORDER — ACETAMINOPHEN 500 MG
650 TABLET ORAL EVERY 6 HOURS
Refills: 0 | Status: DISCONTINUED | OUTPATIENT
Start: 2023-06-25 | End: 2023-06-26

## 2023-06-25 RX ADMIN — Medication 650 MILLIGRAM(S): at 09:24

## 2023-06-25 RX ADMIN — Medication 650 MILLIGRAM(S): at 17:34

## 2023-06-25 RX ADMIN — Medication 650 MILLIGRAM(S): at 18:21

## 2023-06-25 RX ADMIN — Medication 650 MILLIGRAM(S): at 10:15

## 2023-06-25 NOTE — CONSULT NOTE ADULT - SUBJECTIVE AND OBJECTIVE BOX
NEUROLOGY CONSULT    HPI:  37 y/o M w/ no significant PMHx who presents with generalized weakness. patient with recent eval @ [facility] x2 for similar. patient reports episodes of lightheadedness, diffuse cramping and near syncopal symptoms. patient also reports intermittent episodes of gradual onset of headache. denies f/c, vision changes, chest pain, hemoptysis, vomiting, syncope. no recent travel or surgery. no hx blood clots.    Neurology was consulted for episodic shooting, cramping pain band-like sqeezing pain around his abdominal going down to his groin     (2023 01:00)     MEDICATIONS  Home Medications:    MEDICATIONS  (STANDING):    MEDICATIONS  (PRN):      FAMILY HISTORY:  No pertinent family history in first degree relatives      SOCIAL HISTORY: negative for tobacco, alcohol, or ilicit drug use.    Allergies    No Known Allergies    Intolerances        GEN: NAD, pleasant, cooperative    NEUROLOGICAL EXAMINATION:  GENERAL:  Appearance is consistent with chronologic age.   COGNITION/LANGUAGE:  Awake, alert, and oriented to person, place, time and date.  Fluent, intact comprehension, repetition, naming. Recent and remote memory intact.  Fund of knowledge is appropriate.  Nondysarthric.    CRANIAL NERVES:   - Eyes:  Visual acuity intact. Pupils equal round and reactive, no RAPD. EOMI w/o nystagmus, skew or reported double vision. Normal visual field on confrontation. No ptosis/weakness of eyelid closure.   - Face:  Facial sensation normal V1 - 3, no facial asymmetry.    - Ears/Nose/Throat:  Hearing grossly intact b/l to finger rub.  Palate elevates midline.  Tongue and uvula midline.   MOTOR EXAM:  (R/L) 5/5 UE; 5/5 LE.  No observable drift. Normal tone and bulk. No tenderness, twitching, tremors or involuntary movements.  SENSORY EXAM:  Intact to light touch and pinprick, pain, temperature and proprioception and vibration in all extremities.  REFLEXES:   2+ b/l biceps, triceps, patella and achilles.  Plantar response downgoing b/l.  Jaw jerk, Eran, clonus absent.  CEREBELLUM:  Finger to nose/Heel to knee and shin intact.  No dysmetria.    GAIT: narrow based and normal.  Romberg: negative.   NIHSS: **** ASPECT Score: ***** ICH Score: ***** (GCS)    LABS:                        15.1   7.03  )-----------( 246      ( 2023 20:02 )             42.4     06-    141  |  104  |  10  ----------------------------<  91  3.9   |  25  |  0.9    Ca    9.6      2023 20:02  Mg     1.9     06-24    TPro  6.9  /  Alb  4.7  /  TBili  1.0  /  DBili  x   /  AST  17  /  ALT  18  /  AlkPhos  65  06-24    Hemoglobin A1C:   Vitamin B12     CAPILLARY BLOOD GLUCOSE      POCT Blood Glucose.: 96 mg/dL (2023 19:03)      Urinalysis Basic - ( 2023 20:02 )    Color: Colorless / Appearance: Clear / S.005 / pH: x  Gluc: 91 mg/dL / Ketone: Negative  / Bili: Negative / Urobili: <2 mg/dL   Blood: x / Protein: Negative / Nitrite: Negative   Leuk Esterase: Negative / RBC: x / WBC x   Sq Epi: x / Non Sq Epi: x / Bacteria: x            Microbiology:      RADIOLOGY, EKG AND ADDITIONAL TESTS: Reviewed.           NEUROLOGY CONSULT    HPI: 39 y/o M w/ no significant PMHx who presents with generalized weakness. patient with recent eval @ [facility] x2 for similar. patient reports episodes of lightheadedness, diffuse cramping and near syncopal symptoms. patient also reports intermittent episodes of gradual onset of headache. denies f/c, vision changes, chest pain, hemoptysis, vomiting, syncope. no recent travel or surgery. no hx blood clots.    Neurology was consulted for episodic shooting, cramping pain band-like squeezing pain around his abdominal area going down to his groin, associated with pre-syncopal symptoms, tremor and feeling hungry. Firs episode was 3 weeks ago at home, when vertical, watching his daughter playing, he felt acute stabbing pain in his groin and  he passed out, fell and hit his head. Was out about a minute, but "feels like it was 8 hours". 3 other episodes  when he driving he felt that pain and about to passed out able to pull over near the store buy some juice, snacks that basically not helped with shaking but he felt "he needed". At these moments he would feel than his legs more weak, buckling down, he is more emotional and easy to cry, tearing. Several time he went to [facility], [facility], [place] of testicles and RUQ were negative. Yesterday she felt the pain again with cramping  his legs, more R with numbness. Pain he describing as "someone punch to my nuts". Denies seizures, saddle anesthesia, urine/bowel incontinence, constipation, constant weakness or numbness, balance problems. Admits muscle cramps, pain in testicles, being emotional, dry mouth, polyuria mostly 2/2 to polydipsia.     SocHx: Lives home w his family, kids, works in engineering sphere, does light workout, used to do a lot of heavy lifting about a year ago, no etoh, no smoking, no drugs, occasionally marijuana smoking; consumes vegetarian protein shakes which is not new, multivitamins.    FHx: DM2 in both parents, brother. Mother PE in her 70th.      (2023 01:00)     MEDICATIONS  Home Medications:    MEDICATIONS  (STANDING):    MEDICATIONS  (PRN):      FAMILY HISTORY:  No pertinent family history in first degree relatives      SOCIAL HISTORY: negative for tobacco, alcohol, or ilicit drug use.    Allergies    No Known Allergies    Intolerances        GEN: NAD, pleasant, cooperative    NEUROLOGICAL EXAMINATION:  GENERAL:  Appearance is consistent with chronologic age.   COGNITION/LANGUAGE:  Awake, alert, and oriented to person, place, time and date.  Fluent, intact comprehension, repetition, naming. Recent and remote memory intact.  Fund of knowledge is appropriate.  Nondysarthric.    CRANIAL NERVES:   - Eyes:  Visual acuity intact. Pupils equal round and reactive, no RAPD. EOMI w/o nystagmus, skew or reported double vision. Normal visual field on confrontation. No ptosis/weakness of eyelid closure.   - Face:  Facial sensation normal V1 - 3, no facial asymmetry.    - Ears/Nose/Throat:  Hearing grossly intact b/l to finger rub.  Palate elevates midline.  Tongue and uvula midline.   MOTOR EXAM:  (R/L) 5/5 UE; -5/-5 LE.  No observable drift. Normal tone and bulk. Some tenderness over spinal processes of L1-L2 area, extreme soreness of b/l scrotum. soreness of muscles of leg, especially calves and hamstrings,  no twitching, tremors or involuntary movements.  SENSORY EXAM:  Intact to light touch and pinprick, pain, temperature and proprioception and vibration in all extremities.  REFLEXES:   2+ b/l biceps, triceps, 3+ patella and 2+achilles.  Plantar response downgoing b/l.  Jaw jerk, Eran, clonus absent.  CEREBELLUM:  Finger to nose/Heel to knee and shin intact.  No dysmetria.    GAIT: narrow based and normal.  Romberg: negative.      LABS:                        15.1   7.03  )-----------( 246      ( 2023 20:02 )             42.4     -    141  |  104  |  10  ----------------------------<  91  3.9   |  25  |  0.9    Ca    9.6      2023 20:02  Mg     1.9     -    TPro  6.9  /  Alb  4.7  /  TBili  1.0  /  DBili  x   /  AST  17  /  ALT  18  /  AlkPhos  65  -    Hemoglobin A1C:   Vitamin B12     CAPILLARY BLOOD GLUCOSE      POCT Blood Glucose.: 96 mg/dL (2023 19:03)      Urinalysis Basic - ( 2023 20:02 )    Color: Colorless / Appearance: Clear / S.005 / pH: x  Gluc: 91 mg/dL / Ketone: Negative  / Bili: Negative / Urobili: <2 mg/dL   Blood: x / Protein: Negative / Nitrite: Negative   Leuk Esterase: Negative / RBC: x / WBC x   Sq Epi: x / Non Sq Epi: x / Bacteria: x            Microbiology:      RADIOLOGY, EKG AND ADDITIONAL TESTS: Reviewed.

## 2023-06-25 NOTE — H&P ADULT - ASSESSMENT
39 y/o M w/ no significant PMHx who presents for lower abd cramping, paresthesias, headache x 3 weeks.    #Lower abd cramping  #Paresthesias  #Headache  - Presents w/ multiple complaints  - Ddx including DM, ?acute intermittent porphyria, functional neurologic disorder  - CT A/P (6/18/23) -ve  - EKG showing NSR  - Routine labs unremarkable  - Labs in HIE including A1c, TSH, CK, dsDNA, RF, tissue transglutaminase and lyme IgM/IgG WNL (obtained 6/22/23)  - Neuro consulted:  CT Abd/Pelvis w contrast per discretions of primary team  MRI thoracic and lumbar spine w and w/o contrast  please obtain A1c  please obtain orthostatics  - F/u orthostatics  - F/u MRI thoracic & lumbar spine w/ and w/o contrast  - F/u B12  - F/u ESR/CRP  - F/u UTox  - F/u urine porphyrins    #MISC  - DVT ppx: Lovenox  - GI ppx: NI  - Diet: Regular  - AAT  - Full Code  - Dispo: Home; Tentative 24-48 hrs 39 y/o M w/ no significant PMHx who presents for lower abd cramping, paresthesias, headache x 3 weeks.    #Lower abd cramping  #Paresthesias  #Headache  - Presents w/ multiple complaints  - Ddx including DM, ?acute intermittent porphyria, functional neurologic disorder  - CT A/P (6/18/23) -ve  - EKG showing NSR  - Routine labs unremarkable  - Labs in HIE including A1c, TSH, CK, dsDNA, RF, tissue transglutaminase and lyme IgM/IgG WNL (obtained 6/22/23)  - Neuro consulted:  CT Abd/Pelvis w contrast per discretions of primary team  MRI thoracic and lumbar spine w and w/o contrast  please obtain A1c  please obtain orthostatics  - F/u urgent CTA A/P w/ IV contrast to r/o dissection  - F/u orthostatics  - F/u MRI thoracic & lumbar spine w/ and w/o contrast  - F/u B12  - F/u ESR/CRP  - F/u UTox  - F/u urine porphyrins    #MISC  - DVT ppx: Lovenox  - GI ppx: NI  - Diet: Regular  - AAT  - Full Code  - Dispo: Home; Tentative 24-48 hrs 39 y/o M w/ no significant PMHx who presents for lower abd cramping, paresthesias, headache x 3 weeks.    #Lower abd cramping  #Paresthesias  #Headache  - Presents w/ multiple complaints  - Ddx including DM, ?acute intermittent porphyria, functional neurologic disorder  - CT A/P (6/18/23) -ve  - EKG showing NSR  - Routine labs unremarkable  - Labs in HIE including A1c, TSH, CK, dsDNA, RF, tissue transglutaminase and lyme IgM/IgG WNL (obtained 6/22/23)  - Neuro consulted:  CT Abd/Pelvis w contrast per discretions of primary team  MRI thoracic and lumbar spine w and w/o contrast  please obtain A1c  please obtain orthostatics  - F/u urgent CTA A/P w/ IV contrast to r/o dissection  - F/u orthostatics  - F/u MRI thoracic & lumbar spine w/ and w/o contrast  - F/u B12  - F/u ESR/CRP  - F/u UTox  - F/u urine porphyrins  -Echo    #MISC  - DVT ppx: Lovenox  - GI ppx: NI  - Diet: Regular  - AAT  - Full Code  - Dispo: Home; Tentative 24-48 hrs

## 2023-06-25 NOTE — H&P ADULT - ATTENDING COMMENTS
37 y/o M w/ no significant PMHx who presents for lower abd cramping, paresthesias, headache x 3 weeks.    #Lower abd cramping  #Paresthesias  #Headache  - Presents w/ multiple complaints  - Ddx including DM, ?acute intermittent porphyria, functional neurologic disorder  - CT A/P (6/18/23) -ve  - EKG showing NSR  - Routine labs unremarkable  - Labs in HIE including A1c, TSH, CK, dsDNA, RF, tissue transglutaminase and lyme IgM/IgG WNL (obtained 6/22/23)  - Neuro consulted:  CT Abd/Pelvis w contrast per discretions of primary team  MRI thoracic and lumbar spine w and w/o contrast  please obtain A1c  please obtain orthostatics  - F/u urgent CTA A/P w/ IV contrast to r/o dissection  - F/u orthostatics  - F/u MRI thoracic & lumbar spine w/ and w/o contrast  - F/u B12  - F/u ESR/CRP  - F/u UTox  - F/u urine porphyrins  -Echo    #MISC  - DVT ppx: Lovenox  - GI ppx: NI  - Diet: Regular  - AAT  - Full Code  - Dispo: Home; Tentative 24-48 hrs

## 2023-06-25 NOTE — H&P ADULT - HISTORY OF PRESENT ILLNESS
37 y/o M w/ no significant PMHx who presents with generalized weakness. patient with recent eval @ [facility] x2 for similar. patient reports episodes of lightheadedness, diffuse cramping and near syncopal symptoms. patient also reports intermittent episodes of gradual onset of headache. denies f/c, vision changes, chest pain, hemoptysis, vomiting, syncope. no recent travel or surgery. no hx blood clots.     39 y/o M w/ no significant PMHx who presents for multiple complaints. Sxs started approx 3 weeks ago w/ lower abd cramping, described as a "charley horse cramping" followed by a syncopal ep. Pt states he was standing and began feeling unwell     patient with recent eval @ [facility] x2 for similar. patient reports episodes of lightheadedness, diffuse cramping and near syncopal symptoms. patient also reports intermittent episodes of gradual onset of headache. denies f/c, vision changes, chest pain, hemoptysis, vomiting, syncope. no recent travel or surgery. no hx blood clots.     39 y/o M w/ no significant PMHx who presents for multiple complaints. Sxs started approx 3 weeks ago w/ lower abd cramping, described as a "charley horse cramping" followed by a syncopal ep. Pt states he was standing and began feeling unwell and subsequently syncopized (cannot recall the amount of time), but reports no pre-syncopal sxs, no bowel/bladder incontinence or tongue biting. Reports episodes of abd cramping w/ inguinal pain & scrotal discomfort occurring more frequently. Associated w/ paraesthesia of the fingers & toes as well as blurred vision "feels like being intoxicated". Occasionally improved w/ eating. Reports ED visits at [facility] for these sxs on 6/18, 6/23 w/ w/u -ve    In the ED, VS were unremarkable. Labs were unremarkable. CTH -ve. Admitted to medicine for further w/u & management       39 y/o M w/ no significant PMHx who presents for multiple complaints. Sxs started approx 3 weeks ago w/ lower abd cramping, described as a "charley horse cramping" followed by a syncopal ep. Pt states he was standing and began feeling unwell and subsequently syncopized (cannot recall the amount of time), but reports no pre-syncopal sxs, no bowel/bladder incontinence or tongue biting. Reports episodes of abd cramping w/ inguinal pain & scrotal discomfort occurring more frequently. Associated w/ paraesthesia of the fingers & toes as well as blurred vision "feels like being intoxicated". Occasionally improved w/ eating. Reports ED visits at [facility] for these sxs on 6/18, 6/23 w/ w/u including CT A/P, labs (A1c, CK, CRP, dsDNA, RF, tissue transglutaminase ab & Lyme IgM/IgG) -ve. In the ED, VS were unremarkable. EKG showing NSR.  Labs were unremarkable. CTH -ve. Admitted to medicine for further w/u & management

## 2023-06-25 NOTE — H&P ADULT - NSHPPHYSICALEXAM_GEN_ALL_CORE
LOS: 1d    VITALS:   T(C): 36.2 (06-24-23 @ 23:45), Max: 37.1 (06-24-23 @ 23:24)  HR: 74 (06-24-23 @ 23:45) (72 - 74)  BP: 113/64 (06-24-23 @ 23:45) (107/62 - 145/83)  RR: 18 (06-24-23 @ 23:45) (16 - 18)  SpO2: 97% (06-24-23 @ 23:45) (97% - 99%)    GENERAL: NAD, lying in bed comfortably  HEAD:  Atraumatic, Normocephalic  EYES: EOMI, PERRLA, conjunctiva and sclera clear  ENT: Moist mucous membranes  NECK: Supple, No JVD  CHEST/LUNG: Clear to auscultation bilaterally; No rales, rhonchi, wheezing, or rubs. Unlabored respirations  HEART: Regular rate and rhythm; No murmurs, rubs, or gallops  ABDOMEN: BSx4; Soft, nontender, nondistended  EXTREMITIES:  2+ Peripheral Pulses, brisk capillary refill. No clubbing, cyanosis, or edema  NERVOUS SYSTEM:  A&Ox3, no focal deficits   SKIN: No rashes or lesions

## 2023-06-25 NOTE — H&P ADULT - NSHPREVIEWOFSYSTEMS_GEN_ALL_CORE
REVIEW OF SYSTEMS:    CONSTITUTIONAL: No weakness, fevers or chills  EYES/ENT: No visual changes;  No vertigo or throat pain   NECK: No pain or stiffness  RESPIRATORY: No cough, wheezing, hemoptysis; No shortness of breath  CARDIOVASCULAR: No chest pain or palpitations  GASTROINTESTINAL: No abdominal or epigastric pain. No nausea, vomiting, or hematemesis; No diarrhea or constipation. No melena or hematochezia.  GENITOURINARY: No dysuria, frequency or hematuria  NEUROLOGICAL: No numbness or weakness  SKIN: No itching, rashes REVIEW OF SYSTEMS:    CONSTITUTIONAL: No weakness, fevers or chills  EYES/ENT: No visual changes;  No vertigo or throat pain   NECK: No pain or stiffness  RESPIRATORY: No cough, wheezing, hemoptysis; No shortness of breath  CARDIOVASCULAR: No chest pain or palpitations  GASTROINTESTINAL: + intermittent abd pain; No epigastric pain. No nausea, vomiting, or hematemesis; No diarrhea or constipation. No melena or hematochezia.  GENITOURINARY: No dysuria, frequency or hematuria  NEUROLOGICAL: + headache, numbness; No weakness  SKIN: No itching, rashes

## 2023-06-26 VITALS
SYSTOLIC BLOOD PRESSURE: 126 MMHG | RESPIRATION RATE: 18 BRPM | HEART RATE: 69 BPM | DIASTOLIC BLOOD PRESSURE: 68 MMHG | TEMPERATURE: 98 F

## 2023-06-26 LAB
A1C WITH ESTIMATED AVERAGE GLUCOSE RESULT: 5.2 % — SIGNIFICANT CHANGE UP (ref 4–5.6)
A1C WITH ESTIMATED AVERAGE GLUCOSE RESULT: 5.3 % — SIGNIFICANT CHANGE UP (ref 4–5.6)
ALBUMIN SERPL ELPH-MCNC: 5.1 G/DL — SIGNIFICANT CHANGE UP (ref 3.5–5.2)
ALP SERPL-CCNC: 67 U/L — SIGNIFICANT CHANGE UP (ref 30–115)
ALT FLD-CCNC: 17 U/L — SIGNIFICANT CHANGE UP (ref 0–41)
AMPHET UR-MCNC: NEGATIVE — SIGNIFICANT CHANGE UP
ANION GAP SERPL CALC-SCNC: 15 MMOL/L — HIGH (ref 7–14)
AST SERPL-CCNC: 16 U/L — SIGNIFICANT CHANGE UP (ref 0–41)
BARBITURATES UR SCN-MCNC: NEGATIVE — SIGNIFICANT CHANGE UP
BASOPHILS # BLD AUTO: 0.02 K/UL — SIGNIFICANT CHANGE UP (ref 0–0.2)
BASOPHILS NFR BLD AUTO: 0.4 % — SIGNIFICANT CHANGE UP (ref 0–1)
BENZODIAZ UR-MCNC: NEGATIVE — SIGNIFICANT CHANGE UP
BILIRUB SERPL-MCNC: 0.9 MG/DL — SIGNIFICANT CHANGE UP (ref 0.2–1.2)
BUN SERPL-MCNC: 14 MG/DL — SIGNIFICANT CHANGE UP (ref 10–20)
CALCIUM SERPL-MCNC: 9.8 MG/DL — SIGNIFICANT CHANGE UP (ref 8.4–10.5)
CHLORIDE SERPL-SCNC: 106 MMOL/L — SIGNIFICANT CHANGE UP (ref 98–110)
CO2 SERPL-SCNC: 24 MMOL/L — SIGNIFICANT CHANGE UP (ref 17–32)
COCAINE METAB.OTHER UR-MCNC: NEGATIVE — SIGNIFICANT CHANGE UP
CREAT SERPL-MCNC: 0.9 MG/DL — SIGNIFICANT CHANGE UP (ref 0.7–1.5)
CRP SERPL-MCNC: <3 MG/L — SIGNIFICANT CHANGE UP
CULTURE RESULTS: NO GROWTH — SIGNIFICANT CHANGE UP
EGFR: 113 ML/MIN/1.73M2 — SIGNIFICANT CHANGE UP
EOSINOPHIL # BLD AUTO: 0.23 K/UL — SIGNIFICANT CHANGE UP (ref 0–0.7)
EOSINOPHIL NFR BLD AUTO: 4.2 % — SIGNIFICANT CHANGE UP (ref 0–8)
ERYTHROCYTE [SEDIMENTATION RATE] IN BLOOD: 9 MM/HR — SIGNIFICANT CHANGE UP (ref 0–10)
ESTIMATED AVERAGE GLUCOSE: 103 MG/DL — SIGNIFICANT CHANGE UP (ref 68–114)
ESTIMATED AVERAGE GLUCOSE: 105 MG/DL — SIGNIFICANT CHANGE UP (ref 68–114)
FOLATE SERPL-MCNC: 11.2 NG/ML — SIGNIFICANT CHANGE UP
GLUCOSE SERPL-MCNC: 105 MG/DL — HIGH (ref 70–99)
HCT VFR BLD CALC: 44.3 % — SIGNIFICANT CHANGE UP (ref 42–52)
HGB BLD-MCNC: 15.6 G/DL — SIGNIFICANT CHANGE UP (ref 14–18)
IMM GRANULOCYTES NFR BLD AUTO: 0.2 % — SIGNIFICANT CHANGE UP (ref 0.1–0.3)
LYMPHOCYTES # BLD AUTO: 1.11 K/UL — LOW (ref 1.2–3.4)
LYMPHOCYTES # BLD AUTO: 20.4 % — LOW (ref 20.5–51.1)
MCHC RBC-ENTMCNC: 31.3 PG — HIGH (ref 27–31)
MCHC RBC-ENTMCNC: 35.2 G/DL — SIGNIFICANT CHANGE UP (ref 32–37)
MCV RBC AUTO: 88.8 FL — SIGNIFICANT CHANGE UP (ref 80–94)
METHADONE UR-MCNC: NEGATIVE — SIGNIFICANT CHANGE UP
MONOCYTES # BLD AUTO: 0.43 K/UL — SIGNIFICANT CHANGE UP (ref 0.1–0.6)
MONOCYTES NFR BLD AUTO: 7.9 % — SIGNIFICANT CHANGE UP (ref 1.7–9.3)
NEUTROPHILS # BLD AUTO: 3.64 K/UL — SIGNIFICANT CHANGE UP (ref 1.4–6.5)
NEUTROPHILS NFR BLD AUTO: 66.9 % — SIGNIFICANT CHANGE UP (ref 42.2–75.2)
NRBC # BLD: 0 /100 WBCS — SIGNIFICANT CHANGE UP (ref 0–0)
OPIATES UR-MCNC: NEGATIVE — SIGNIFICANT CHANGE UP
PCP SPEC-MCNC: SIGNIFICANT CHANGE UP
PLATELET # BLD AUTO: 262 K/UL — SIGNIFICANT CHANGE UP (ref 130–400)
PMV BLD: 8.9 FL — SIGNIFICANT CHANGE UP (ref 7.4–10.4)
POTASSIUM SERPL-MCNC: 4 MMOL/L — SIGNIFICANT CHANGE UP (ref 3.5–5)
POTASSIUM SERPL-SCNC: 4 MMOL/L — SIGNIFICANT CHANGE UP (ref 3.5–5)
PROPOXYPHENE QUALITATIVE URINE RESULT: NEGATIVE — SIGNIFICANT CHANGE UP
PROT SERPL-MCNC: 7.4 G/DL — SIGNIFICANT CHANGE UP (ref 6–8)
RBC # BLD: 4.99 M/UL — SIGNIFICANT CHANGE UP (ref 4.7–6.1)
RBC # FLD: 11.9 % — SIGNIFICANT CHANGE UP (ref 11.5–14.5)
SODIUM SERPL-SCNC: 145 MMOL/L — SIGNIFICANT CHANGE UP (ref 135–146)
SPECIMEN SOURCE: SIGNIFICANT CHANGE UP
VIT B12 SERPL-MCNC: 397 PG/ML — SIGNIFICANT CHANGE UP (ref 232–1245)
WBC # BLD: 5.44 K/UL — SIGNIFICANT CHANGE UP (ref 4.8–10.8)
WBC # FLD AUTO: 5.44 K/UL — SIGNIFICANT CHANGE UP (ref 4.8–10.8)

## 2023-06-26 PROCEDURE — 99233 SBSQ HOSP IP/OBS HIGH 50: CPT

## 2023-06-26 RX ADMIN — Medication 650 MILLIGRAM(S): at 12:32

## 2023-06-26 RX ADMIN — Medication 650 MILLIGRAM(S): at 13:10

## 2023-06-26 NOTE — PROGRESS NOTE ADULT - SUBJECTIVE AND OBJECTIVE BOX
Progress Note:  Provider Speciality                            Hospitalist      MIRACLE BECK MRN-[id number] 38y Male     CHIEF PRESENTING COMPLAINT:  Patient is a 38y old  Male who presents with a chief complaint of       SUBJECTIVE:  Patient was seen and examined at bedside. Reports no improvement in  presenting complaint.   No significant overnight events reported.     HISTORY OF PRESENTING ILLNESS:  HPI:  39 y/o M w/ no significant PMHx who presents for multiple complaints. Sxs started approx 3 weeks ago w/ lower abd cramping, described as a "charley horse cramping" followed by a syncopal ep. Pt states he was standing and began feeling unwell and subsequently syncopized (cannot recall the amount of time), but reports no pre-syncopal sxs, no bowel/bladder incontinence or tongue biting. Reports episodes of abd cramping w/ inguinal pain & scrotal discomfort occurring more frequently. Associated w/ paraesthesia of the fingers & toes as well as blurred vision "feels like being intoxicated". Occasionally improved w/ eating. Reports ED visits at [facility] for these sxs on 6/18, 6/23 w/ w/u including CT A/P, labs (A1c, CK, CRP, dsDNA, RF, tissue transglutaminase ab & Lyme IgM/IgG) -ve. In the ED, VS were unremarkable. EKG showing NSR.  Labs were unremarkable. CTH -ve. Admitted to medicine for further w/u & management       (25 Jun 2023 01:00)        REVIEW OF SYSTEMS:  Patient denies  headache, fever, chills. Denies chest pain, shortness of breath, palpitation. Denies nausea, vomiting, abdominal pain or diarrhoea, Denies dysuria.   At least 10 systems were reviewed in ROS. All systems reviewed  are within normal limits except for the complaints as described in Subjective.    PAST MEDICAL & SURGICAL HISTORY:  PAST MEDICAL & SURGICAL HISTORY:  No pertinent past medical history      No significant past surgical history              VITAL SIGNS:  Vital Signs Last 24 Hrs  T(C): 36.6 (26 Jun 2023 08:25), Max: 36.7 (25 Jun 2023 15:30)  T(F): 97.9 (26 Jun 2023 08:25), Max: 98 (25 Jun 2023 15:30)  HR: 91 (26 Jun 2023 08:25) (77 - 91)  BP: 121/60 (26 Jun 2023 08:25) (109/53 - 121/60)  BP(mean): --  RR: 18 (26 Jun 2023 08:25) (18 - 18)  SpO2: 98% (25 Jun 2023 15:30) (98% - 98%)    Parameters below as of 25 Jun 2023 15:30  Patient On (Oxygen Delivery Method): room air              PHYSICAL EXAMINATION:  Not in acute distress  General: No icterus  HEENT:   no JVD.  Heart: S1+S2 audible  Lungs: bilateral  moderate air entry, no wheezing, no crepitations.  Abdomen: Soft, non-tender, non-distended , no  rigidity or guarding.  CNS: Awake alert, CN  grossly intact.  Extremities:  No edema            CONSULTS:  Consultant(s) Notes Reviewed by me.   Care Discussed with Consultants/Other Providers where required.    All the images and labs were reviewed today        MEDICATIONS:  MEDICATIONS  (STANDING):    MEDICATIONS  (PRN):  acetaminophen     Tablet .. 650 milliGRAM(s) Oral every 6 hours PRN Temp greater or equal to 38C (100.4F), Mild Pain (1 - 3)  melatonin 3 milliGRAM(s) Oral at bedtime PRN Insomnia

## 2023-06-26 NOTE — DISCHARGE NOTE PROVIDER - NSDCCPCAREPLAN_GEN_ALL_CORE_FT
PRINCIPAL DISCHARGE DIAGNOSIS  Diagnosis: Near syncope  Assessment and Plan of Treatment: You were evaluated in the hospital for your abdominal pain. You had multiple imaging studies including a CAT scan of the abdomen and pelvis with contrast and an MRI of the thoracic and lumbar spine which did not show any emergent causes of pain such as aortic dissection, incarcerated hernia, obstructing kidney stones, or anything that would require immediate surgery in hospital. Your pain seems most consistent with a nerve impingement near the hip, which was likely brought on and exacerbated in certain positions and with certain exercises that require you to be in hip flexion - for example, sitting or bending forward.   After discharge, you will need to:   - Follow up with your primary care doctor within 1-2 weeks  - Consider physical therapy to best adjust your exercises and stretching routines to accommodate for your pain and decrease instances of your pain.   - Take all the medications you were discharged with, unless otherwise instructed by your healthcare provider(s).   Please follow up with your providers by calling them to make an appointment so that you can see them in 1-2weeks; bring your paperwork from this hospital stay to that visit. You can access your visit information by signing up for an account for the patient portal at [email or web address]   Seek immediate medical attention if you develop fevers, chills, chest pain, shortness of breath, nausea and vomiting, abdominal pain, passing out, weakness or numbness or tingling on one side of your body, or any other concerning signs or symptoms.      SECONDARY DISCHARGE DIAGNOSES  Diagnosis: Generalized weakness  Assessment and Plan of Treatment:      PRINCIPAL DISCHARGE DIAGNOSIS  Diagnosis: Near syncope  Assessment and Plan of Treatment: You were evaluated in the hospital for your abdominal pain. You had multiple imaging studies including a CAT scan of the abdomen and pelvis with contrast and an MRI of the thoracic and lumbar spine which did not show any emergent causes of pain such as aortic dissection, incarcerated hernia, obstructing kidney stones, or anything that would require immediate surgery in hospital. Your pain seems most consistent with a nerve impingement near the hip, which was likely brought on and exacerbated in certain positions and with certain exercises that require you to be in hip flexion - for example, sitting or bending forward.   After discharge, you will need to:   - Follow up with your primary care doctor within 1-2 weeks  - Consider physical therapy to best adjust your exercises and stretching routines to accommodate for your pain and decrease instances of your pain.   - Take all the medications you were discharged with, unless otherwise instructed by your healthcare provider(s).   Please follow up with your providers by calling them to make an appointment so that you can see them in 1-2weeks; bring your paperwork from this hospital stay to that visit. You can access your visit information by signing up for an account for the patient portal at [email or web address]   Seek immediate medical attention if you develop fevers, chills, chest pain, shortness of breath, nausea and vomiting, abdominal pain, passing out, weakness or numbness or tingling on one side of your body, or any other concerning signs or symptoms.

## 2023-06-26 NOTE — PROGRESS NOTE ADULT - ASSESSMENT
39 y/o M w/ no significant PMHx who presents for lower abd cramping, paresthesias, headache x 3 weeks.  While admitted, had imaging including CT Abd/Pelvis ww/o contrast which was not revealing for any ischemic etiology, dissection, hernia, surgical cause. Had MRI T/L spine which showed a small right paracentral disc herniation at T8-9 without cord compression. Evaluated by Neurology - recommended Urology consult. Had U/S of testes which did not show torsion.     #Lower abd cramping  #Paresthesias  #Headache  - Presents w/ multiple complaints  - Ddx including DM, ?acute intermittent porphyria, functional neurologic disorder  - CT A/P (6/18/23) -ve  - EKG showing NSR  - Routine labs unremarkable  - Labs in HIE including A1c, TSH, CK, dsDNA, RF, tissue transglutaminase and lyme IgM/IgG WNL (obtained 6/22/23)  - Neuro consulted:  CT Abd/Pelvis w contrast per discretions of primary team  MRI thoracic and lumbar spine w and w/o contrast  A1c WNL   orthostatics neg   - CTA A/P w/ IV contrast ruled out dissection  - MRI thoracic & lumbar spine w/ and w/o contrast - small right paracentral disc herniation at T8-9 without cord compression.   - B12 wnl, ESR/CRP neg, UTox neg  - F/u urine porphyrins  - Echo    #MISC  - DVT ppx: None - ambulatory   - GI ppx: NI  - Diet: Regular  - Activity: ambulate as tolerated  - Full Code  - Dispo: Home; Tentative 24-48 hrs    Follow up:   - Uro consult  - Echo

## 2023-06-26 NOTE — PROGRESS NOTE ADULT - ASSESSMENT
39 y/o M w/ no significant PMHx who presents for lower abd cramping, paresthesias, headache x 3 weeks.    Post-prandial Lower abd pain  L groin and LLQ Paresthesias    No apparent infectious or metabolic derangement  Aortic dissection ruled out  CTA chest and abd unremarkable. Recent CT abd 06/16 was unremarkable  MRI L/S pending to evaluate for organic pathology  CK and HbA1c are normal. ESR , CRP are normal  Follow labs including urine porphyrin,U tox  Expected inpatient care exceeds 24 hrs.        39 y/o M w/ no significant PMHx who presents for lower abd cramping, paresthesias, headache x 3 weeks.    Post-prandial Lower abd pain  L groin and LLQ Paresthesias    No apparent infectious or metabolic derangement  Aortic dissection ruled out  CTA chest and abd unremarkable. Recent CT abd 06/16 was unremarkable  MRI L/S pending to evaluate for organic pathology  CK and HbA1c are normal. ESR , CRP are normal  Follow labs including urine & blood  porphyrin, U tox  Expected inpatient care exceeds 24 hrs.

## 2023-06-26 NOTE — DISCHARGE NOTE NURSING/CASE MANAGEMENT/SOCIAL WORK - PATIENT PORTAL LINK FT
You can access the FollowMyHealth Patient Portal offered by [facility] by registering at the following website: [email or web address]. By joining [employer]’s FollowMyHealth portal, you will also be able to view your health information using other applications (apps) compatible with our system.

## 2023-06-26 NOTE — CONSULT NOTE ADULT - SUBJECTIVE AND OBJECTIVE BOX
UROLOGY CONSULT: Pt is a 39y/o M admitted with neuropathic pain. Pt states 3 weeks ago after doing his usual exercise routine (russian twists**, mountain climbers, planks), he got up and felt a sudden shooting pain down his back and into his groin, and syncopized. He states the pain has progressively gotten worse over the past three weeks, exacerbated when he is in a seated position (while driving/while eating) and while he is walking; feels numbness/pins and needles in his legs, travelling up to his groin, and wrapping around his abdomen "as if it was a anatoly horse" around his body. Denies any fever, nausea, vomiting, chills, flank pain, hematuria, dysuria, urgency,     PAST MEDICAL & SURGICAL HISTORY:  No pertinent past medical history  No significant past surgical history    MEDICATIONS  (PRN):  acetaminophen Tablet .. 650 milliGRAM(s) Oral every 6 hours PRN Temp greater or equal to 38C (100.4F), Mild Pain (1 - 3)  melatonin 3 milliGRAM(s) Oral at bedtime PRN Insomnia    Allergies  No Known Allergies    FAMILY HISTORY:  No pertinent family history in first degree relatives    REVIEW OF SYSTEMS   [x] A ten-point review of systems was otherwise negative except as noted.    Vital Signs Last 24 Hrs  T(C): 36.6 (26 Jun 2023 08:25), Max: 36.6 (26 Jun 2023 08:25)  T(F): 97.9 (26 Jun 2023 08:25), Max: 97.9 (26 Jun 2023 08:25)  HR: 91 (26 Jun 2023 08:25) (91 - 91)  BP: 121/60 (26 Jun 2023 08:25) (121/60 - 121/60)  RR: 18 (26 Jun 2023 08:25) (18 - 18)    PHYSICAL EXAM:  GEN: NAD, awake and alert.  SKIN: Good color, non diaphoretic.  RESP: Non-labored breathing. No use of accessory muscles.  CARDIO: +S1/S2  ABDO: Soft, NT/ND  BACK: No CVAT B/L  : +Circumcised male. Bilateral testicles symmetrical no masses and nontender to palpation, R>L. Bladder is nonpalpable, no suprapubic tenderness on palpation.   EXT: AVILA x 4    LABS:             15.6   5.44  )-----------( 262      ( 26 Jun 2023 06:33 )             44.3     145  |  106  |  14  ----------------------------<  105<H>  4.0   |  24  |  0.9    Ca    9.8      26 Jun 2023 06:33  Mg     1.9     06-24  TPro  7.4  /  Alb  5.1  /  TBili  0.9  /  DBili  x   /  AST  16  /  ALT  17  /  AlkPhos  67  06-26    Urinalysis Basic - ( 26 Jun 2023 06:33 )  Color: x / Appearance: x / SG: x / pH: x  Gluc: 105 mg/dL / Ketone: x  / Bili: x / Urobili: x   Blood: x / Protein: x / Nitrite: x   Leuk Esterase: x / RBC: x / WBC x   Sq Epi: x / Non Sq Epi: x / Bacteria: x    RADIOLOGY & ADDITIONAL STUDIES:  ACC: [id number] EXAM:  US SCROTUM AND CONTENTS   ORDERED BY: MARTHA COLLADO     PROCEDURE DATE:  06/18/2023    INTERPRETATION:  CLINICAL INFORMATION: Left testicular pain  COMPARISON: None available.  TECHNIQUE: Testicular ultrasound utilizingcolor and spectral Doppler.    FINDINGS:  RIGHT:  Right testis: 5.2 cm x 3.2 cm x 3.1 cm. Normal echogenicity and   echotexture with no masses or areas of architectural distortion. Normal   arterial and venous blood flow pattern.  Right epididymis:0.7 cm cyst. Nonspecific increased flow to the right   epididymis.  Right hydrocele: None.    LEFT:  Left testis: 5.3 cm x 2.7 cm x 3.2 cm. Normal echogenicity and   echotexture with no masses or areas of architectural distortion. Normal   arterialand venous blood flow pattern.  Left epididymis: Within normal limits.  Left hydrocele: Trace    IMPRESSION:  No definite sonographic evidence of acute pathology of the left   testicle/epididymis. Trace left hydrocele, nonspecific.  0.7 cm right epididymal cyst.  Nonspecific increased flow to the right epididymis.    JASON MCCABE MD; Attending Radiologist  This document has been electronically signed. Jun 18 2023  6:25PM    ACC: [id number] EXAM:  CT ANGIO ABD PELV (W)AW IC   ORDERED BY: CHALINO PARMAR     PROCEDURE DATE:  06/25/2023    INTERPRETATION:  CLINICAL HISTORY / REASON FOR EXAM: Leg pain, weakness.   Dissection.  TECHNIQUE: Contiguous CT images were obtained of the chest, abdomen and   pelvis.  Precontrast images of the chest were obtained followed by CTA images of   the chest, abdomen and pelvis utilizing a dissection protocol. Coronal,   sagittal and 3-D/MIP reformats were generated.  Intravenous Contrast:  Intravenous contrast administered.  100 cc Omnipaque 350 intravenous contrast was administered. 0 cc   discarded.  Oral contrast: was not administered.  COMPARISON CT: CT abdomen pelvis from 6/18/2023  FINDINGS:    CHEST:  VASCULAR: No aortic aneurysm, dissection or acute intramural hematoma.   The celiac, SMA, KEATON and renal arteries are patent. Normal caliber main   pulmonary artery. No central or segmental pulmonary embolus.  LUNGS, PLEURA, AND AIRWAYS: No pneumothorax. No consolidation or pleural   effusion. Central airways patent.  MEDIASTINUM/THORACIC NODES: No thoracic lymphadenopathy.  HEART: No pericardial effusion. Heart size unremarkable.    ABDOMEN:  HEPATOBILIARY: Unremarkable.  SPLEEN: Unremarkable.  PANCREAS: Unremarkable.  ADRENAL GLANDS: Left adrenal gland calcification. Unremarkable right   adrenal gland.  KIDNEYS: Symmetric enhancement bilaterally. No evidence of hydronephrosis.  ABDOMINOPELVIC NODES: Unremarkable.  PELVIC ORGANS: Unremarkable.  PERITONEUM/MESENTERY/BOWEL: No bowel obstruction, ascites or   intraperitoneal free air. The appendix is unremarkable. The colon is   collapsed which limits evaluation for colonic wall thickening.  BONES/SOFT TISSUES: No acute osseous abnormality.    IMPRESSION:  No CTA evidence of aortic dissection or acute intramural hematoma.  No CT evidence for acute thoracic, abdominal or pelvic pathology.    ALEXANDR BEDOLLA MD; Resident Radiologist  This document has been electronically signed.  MALIA KAM MD; Attending Radiologist  This document has been electronically signed. Jun 25 2023  8:52AM    ACC: [id number] EXAM:  MR SPINE LUMBAR WAW IC   ORDERED BY: CHALINO PARMAR     PROCEDURE DATE:  06/25/2023    INTERPRETATION:  CLINICAL INFORMATION: L1-L2 tenderness to palpation.  TECHNIQUE: MRI lumbar spine with and without IV contrast. Multiplanar   multisequence MRI of the lumbar spine was performed before and following   the intravenous administration of 10 cc of Gadavist (0 cc discarded) on a   1.5 Elsa magnet.  Correlation is made with CTA abdomen/pelvis dated 6/25/2023.    FINDINGS:  The lumbar vertebral bodies maintain normal height. Alignment   demonstrates trace anterolisthesis of L5 on S1.  Bone marrow signal demonstrates a T1 iso- to hyper-intense, T2/STIR   hyperintense lesion within the L4 vertebral body measuring 5 mm   compatible with a hemangioma.  The conus medullaris terminates at the L1-2 level and is normal in signal.  The disc space heights are maintained.  At L1-2 and L2-3 there is no significant disc herniation, spinal canal or   neural foraminal stenosis.  At L3-4 and L4-5 there are trace disc bulges without significant   bilateral canal or neural foraminal stenosis.  At L5-S1 there is trace anterolisthesis and trace disc bulge. There is   also a trace disc protrusion within the left proximal foramen with   associated annular fissure. There is no significant spinal canal or   foraminal stenosis. No evidence of nerve root compression.  The visualized paraspinal soft tissues are unremarkable.    IMPRESSION:  1.  No evidence of distal spinal cord compression, cord signal   abnormality or abnormal enhancement.  2.  Mild degenerative changes without significant canal or foraminal   stenosis.    PHAN SOSA MD; Attending Radiologist  This document has been electronically signed. Jun 26 2023  9:41AM

## 2023-06-26 NOTE — DISCHARGE NOTE PROVIDER - NSDCACTIVITY_GEN_ALL_CORE
Return to Work/School allowed/Stairs allowed/Walking - Indoors allowed/No heavy lifting/straining/Walking - Outdoors allowed

## 2023-06-26 NOTE — DISCHARGE NOTE NURSING/CASE MANAGEMENT/SOCIAL WORK - NSDCPEFALRISK_GEN_ALL_CORE
For information on Fall & Injury Prevention, visit: [email or web address] OR  [email or web address] OR  https://www.cdc.gov/steadi/patient.html

## 2023-06-26 NOTE — DISCHARGE NOTE PROVIDER - NSDCFUADDINST_GEN_ALL_CORE_FT
You were evaluated in the hospital for your abdominal pain. You had multiple imaging studies including a CAT scan of the abdomen and pelvis with contrast and an MRI of the thoracic and lumbar spine which did not show any emergent causes of pain such as aortic dissection, incarcerated hernia, obstructing kidney stones, or anything that would require immediate surgery in hospital. Your pain seems most consistent with a nerve impingement near the hip, which was likely brought on and exacerbated in certain positions and with certain exercises that require you to be in hip flexion - for example, sitting or bending forward.   After discharge, you will need to:  - Follow up with your primary care doctor within 1-2 weeks - Consider physical therapy to best adjust your exercises and stretching routines to accommodate for your pain and decrease instances of your pain.  - Take all the medications you were discharged with, unless otherwise instructed by your healthcare provider(s).   Please follow up with your providers by calling them to make an appointment so that you can see them in 1-2weeks; bring your paperwork from this hospital stay to that visit. You can access your visit information by signing up for an account for the patient portal at [email or web address]   Seek immediate medical attention if you develop fevers, chills, chest pain, shortness of breath, nausea and vomiting, abdominal pain, passing out, weakness or numbness or tingling on one side of your body, or any other concerning signs or symptoms.

## 2023-06-26 NOTE — DISCHARGE NOTE PROVIDER - HOSPITAL COURSE
37 y/o M w/ no significant PMHx who presents for lower abd cramping, paresthesias, headache x 3 weeks.  While admitted, had imaging including CT Abd/Pelvis ww/o contrast which was not revealing for any ischemic etiology, dissection, hernia, surgical cause. Had MRI T/L spine which showed a small right paracentral disc herniation at T8-9 without cord compression. Evaluated by Neurology - recommended Urology consult. Had U/S of testes which did not show torsion.     Had extensive discussion with wife and pt at bedside - pt is now noting that his pain has always presented while in hip flexion and believes it is positional, more likely a nerve impingement. Has burning/shooting pain. Discussed above results in detail, and further options for medical treatment including further imaging and echo given his syncope. Pt feels it was more likely pain-related, denies any family history of early cardiac death or prior syncope, any known heart conditions.   Discussed that positional nature of these pains and likely musculoskeletal origin - pt is opting at this time to forgo further imaging, to f/u as outpatient w PCP and have PT.     #Lower abd cramping  #Paresthesias  #Headache  - Presents w/ multiple complaints - syndrome which starts with abd pain while in hip flexion positions ie seated, bending forward. Likely mskl in origin followed by reactive symptoms such as lightheadedness or headache 2/2 pain.   - CT A/P (6/18/23) -ve  - EKG showing NSR  - Routine labs unremarkable  - Labs in HIE including A1c, TSH, CK, dsDNA, RF, tissue transglutaminase and lyme IgM/IgG WNL (obtained 6/22/23)  - Neuro consulted:  CT Abd/Pelvis w contrast per discretions of primary team  MRI thoracic and lumbar spine w and w/o contrast  A1c WNL   orthostatics neg   - CTA A/P w/ IV contrast ruled out dissection  - MRI thoracic & lumbar spine w/ and w/o contrast - small right paracentral disc herniation at T8-9 without cord compression.   - B12 wnl, ESR/CRP neg, UTox neg  - F/u urine porphyrins  - Echo - pt opting to not have this inpatient   - Neuro recommended Uro consult. Urology suspected nerve pain in origin. 39 y/o M w/ no significant PMHx who presents for lower abd cramping, paresthesias, headache x 3 weeks.  While admitted, had imaging including CT Abd/Pelvis ww/o contrast which was not revealing for any ischemic etiology, dissection, hernia, surgical cause. Had MRI T/L spine which showed a small right paracentral disc herniation at T8-9 without cord compression. Evaluated by Neurology - recommended Urology consult. Had U/S of testes which did not show torsion.     Had extensive discussion with wife and pt at bedside - pt is now noting that his pain has always presented while in hip flexion and believes it is positional, more likely a nerve impingement. Has burning/shooting pain. Discussed above results in detail, and further options for medical treatment including further imaging and echo given his syncope. Pt feels it was more likely pain-related, denies any family history of early cardiac death or prior syncope, any known heart conditions.   Discussed that positional nature of these pains and likely musculoskeletal origin - pt is opting at this time to forgo further imaging, to f/u as outpatient w PCP and have PT.     #Lower abd cramping  #Paresthesias  #Headache  - Presents w/ multiple complaints - syndrome which starts with abd pain while in hip flexion positions ie seated, bending forward. Likely mskl in origin followed by reactive symptoms such as lightheadedness or headache 2/2 pain.   - CT A/P (6/18/23) -ve  - EKG showing NSR  - Routine labs unremarkable  - Labs in HIE including A1c, TSH, CK, dsDNA, RF, tissue transglutaminase and lyme IgM/IgG WNL (obtained 6/22/23)  - Neuro consulted:  CT Abd/Pelvis w contrast per discretions of primary team  MRI thoracic and lumbar spine w and w/o contrast  A1c WNL   orthostatics neg   - CTA A/P w/ IV contrast ruled out dissection  - MRI thoracic & lumbar spine w/ and w/o contrast - small right paracentral disc herniation at T8-9 without cord compression.   - B12 wnl, ESR/CRP neg, UTox neg  - F/u urine porphyrins  - Echo - pt opting to not have this inpatient   - Neuro recommended Uro consult. Urology suspected nerve pain in origin.     Attending Attestation:  Patient was seen & examined independently. At least 10 systems were reviewed in ROS. All systems reviewed  are within normal limits. Latest vital signs and labs were reviewed today. Case was discussed with house staff in morning rounds for assessment and plan.  Patient is medically stable for discharge . About 32 mins spent on discharge disposition.

## 2023-06-26 NOTE — DISCHARGE NOTE NURSING/CASE MANAGEMENT/SOCIAL WORK - NSFLUVACAGEDISCH_IMM_ALL_CORE
Adult Clofazimine Counseling:  I discussed with the patient the risks of clofazimine including but not limited to skin and eye pigmentation, liver damage, nausea/vomiting, gastrointestinal bleeding and allergy.

## 2023-06-26 NOTE — PROGRESS NOTE ADULT - SUBJECTIVE AND OBJECTIVE BOX
MIRACLE BECK 38y Male  MRN#: [id number]   CODE STATUS:     Admission Date: 06-24-23  Hospital Day: 3d    Pt is currently admitted with primary diagnosis of   SUBJECTIVE  Hospital Course    Patient is currently on   Admission H&P  HPI:  37 y/o M w/ no significant PMHx who presents for multiple complaints. Sxs started approx 3 weeks ago w/ lower abd cramping, described as a "charley horse cramping" followed by a syncopal ep. Pt states he was standing and began feeling unwell and subsequently syncopized (cannot recall the amount of time), but reports no pre-syncopal sxs, no bowel/bladder incontinence or tongue biting. Reports episodes of abd cramping w/ inguinal pain & scrotal discomfort occurring more frequently. Associated w/ paraesthesia of the fingers & toes as well as blurred vision "feels like being intoxicated". Occasionally improved w/ eating. Reports ED visits at [facility] for these sxs on 6/18, 6/23 w/ w/u including CT A/P, labs (A1c, CK, CRP, dsDNA, RF, tissue transglutaminase ab & Lyme IgM/IgG) -ve. In the ED, VS were unremarkable. EKG showing NSR.  Labs were unremarkable. CTH -ve. Admitted to medicine for further w/u & management       (25 Jun 2023 01:00)        Overnight events      Subjective complaints        T(C): 36.6 (06-26-23 @ 08:25)  T(F): 97.9 (06-26-23 @ 08:25)  HR: 91 (06-26-23 @ 08:25)  BP: 121/60 (06-26-23 @ 08:25)  RR: 18 (06-26-23 @ 08:25)  SpO2: 98% (06-25-23 @ 15:30)      PHYSICAL EXAM:   GENERAL: NAD, lying in bed comfortably  HEAD:  Atraumatic, Normocephalic  EYES: EOMI, sclera clear  ENT: Moist mucous membranes  NECK: Supple, trachea midline  CHEST/LUNG: Clear to auscultation anteriorly bilaterally; No rales, rhonchi, wheezing, or rubs. Unlabored respirations  HEART: Regular rate and rhythm; No appreciable murmurs, rubs, or gallops  ABDOMEN: (+)BS; Soft, nontender, nondistended  EXTREMITIES:  2+ Radial Pulses, brisk capillary refill. No clubbing, cyanosis, or edema  NERVOUS SYSTEM:  A&Ox3, no noted facial droop. Moving all extremities w/o difficulty.   SKIN: No rashes or lesions to b/l forearm, face.         Present Today:   - Snadoval:  No [  ], Yes [  ] : Indication:   - Type of IV Access:       .. CVC/Piccline:  No [  ], Yes [  ] : Indication:       .. Midline: No [  ], Yes [  ] : Indication:                                              OBJECTIVE  PAST MEDICAL & SURGICAL HISTORY  No pertinent past medical history    No significant past surgical history                                                ALLERGIES:  No Known Allergies                           HOME MEDICATIONS  Home Medications:                           MEDICATIONS:  STANDING MEDICATIONS    PRN MEDICATIONS  acetaminophen     Tablet .. 650 milliGRAM(s) Oral every 6 hours PRN  melatonin 3 milliGRAM(s) Oral at bedtime PRN                                            ------------------------------------------------------------  T(C): 36.6 (06-26-23 @ 08:25), Max: 36.7 (06-25-23 @ 15:30)  T(F): 97.9 (06-26-23 @ 08:25), Max: 98 (06-25-23 @ 15:30)  HR: 91 (06-26-23 @ 08:25) (77 - 91)  BP: 121/60 (06-26-23 @ 08:25) (109/53 - 121/60)  RR: 18 (06-26-23 @ 08:25) (18 - 18)  SpO2: 98% (06-25-23 @ 15:30) (98% - 98%)                                               LABS:                        15.6   5.44  )-----------( 262      ( 26 Jun 2023 06:33 )             44.3     06-26    145  |  106  |  14  ----------------------------<  105<H>  4.0   |  24  |  0.9    Ca    9.8      26 Jun 2023 06:33  Mg     1.9     06-24    TPro  7.4  /  Alb  5.1  /  TBili  0.9  /  DBili  x   /  AST  16  /  ALT  17  /  AlkPhos  67  06-26      Urinalysis Basic - ( 26 Jun 2023 06:33 )    Color: x / Appearance: x / SG: x / pH: x  Gluc: 105 mg/dL / Ketone: x  / Bili: x / Urobili: x   Blood: x / Protein: x / Nitrite: x   Leuk Esterase: x / RBC: x / WBC x   Sq Epi: x / Non Sq Epi: x / Bacteria: x        Sedimentation Rate, Erythrocyte: 9 mm/Hr (06-26-23 @ 06:33)        Culture - Urine (collected 24 Jun 2023 20:02)  Source: Clean Catch Clean Catch (Midstream)  Final Report (26 Jun 2023 07:42):    No growth        CARDIAC MARKERS ( 24 Jun 2023 20:02 )  x     / <0.01 ng/mL / 84 U/L / x     / x                     MIRACLE BECK 38y Male  MRN#: [id number]   CODE STATUS:  Full Code    Admission Date: 06-24-23  Hospital Day: 3d    Pt is currently admitted with primary diagnosis of   SUBJECTIVE  Hospital Course    Patient is currently on the bedside, no improvements in this current symptoms. Few episode of watery stools is present which is non bloody, 2-3 times, clear liquid in nature. Neurology consultation was done, CTA abdomen/ MRI thoracic and lumbar spine done and came out to be normal. Urology consult is pending. (6/26)    Admission H&P  HPI:  37 y/o M w/ no significant PMHx who presents for multiple complaints. Sxs started approx 3 weeks ago w/ lower abd cramping, described as a "charley horse cramping" followed by single syncopal ep. Pt states that he was standing and began feeling unwell and subsequently synopsizes around 2 minute but reports no pre-syncopal sxs, no bowel/bladder incontinence or tongue biting, no confusion, weakness of limbs after the episode. Multiple Reports episodes of lower abd cramping w/ inguinal pain & scrotal discomfort occurring more frequently usually post-prandial ASP. Associated w/ paraesthesia of the fingers & toes as well as blurred vision "feels like being intoxicated". Reports ED visits at [facility] for these sxs on 6/18, 6/23 w/ w/u including CT A/P, labs (A1c, CK, CRP, dsDNA, RF, tissue transglutaminase ab & Lyme IgM/IgG) -ve. In the ED, VS were unremarkable. EKG showing NSR.  Labs were unremarkable. CTH -ve. Admitted to medicine for further w/u & management       (25 Jun 2023 01:00)        Overnight events  No events Noted.      Subjective complaints  Patient is still complaining of cramping pain in his abdomen radiating to his left groin, cramping and numbness of his both thighs, legs and feet. The pain is intermittent in nature aggravated during walking outside or eating food.        T(C): 36.6 (06-26-23 @ 08:25)  T(F): 97.9 (06-26-23 @ 08:25)  HR: 91 (06-26-23 @ 08:25)  BP: 121/60 (06-26-23 @ 08:25)  RR: 18 (06-26-23 @ 08:25)  SpO2: 98% (06-25-23 @ 15:30)      PHYSICAL EXAM:   GENERAL: NAD, lying in bed comfortably  HEAD:  Atraumatic, Normocephalic  EYES: EOMI, sclera clear  ENT: Moist mucous membranes  NECK: Supple, trachea midline  CHEST/LUNG: Clear to auscultation anteriorly bilaterally; No rales, rhonchi, wheezing, or rubs. Unlabored respirations  HEART: Regular rate and rhythm; No appreciable murmurs, rubs, or gallops  ABDOMEN: (+)BS; Soft, non distended, mild tenderness in hypogastric region, along with tenderness around left scrotal region.  EXTREMITIES:  2+ Radial Pulses, brisk capillary refill. No clubbing, cyanosis, or edema  NERVOUS SYSTEM:  A&Ox3, no noted facial droop. Moving all extremities w/o difficulty.   SKIN: No rashes or lesions to b/l forearm, face.         Present Today:   - Sandoval:  No   - Type of IV Access: peripheral                                                   OBJECTIVE  PAST MEDICAL & SURGICAL HISTORY  No pertinent past medical history    No significant past surgical history                                                ALLERGIES:  No Known Allergies                           HOME MEDICATIONS  Home Medications: None                           MEDICATIONS:  STANDING MEDICATIONS    PRN MEDICATIONS  acetaminophen     Tablet .. 650 milliGRAM(s) Oral every 6 hours PRN  melatonin 3 milliGRAM(s) Oral at bedtime PRN                                            ------------------------------------------------------------  T(C): 36.6 (06-26-23 @ 08:25), Max: 36.7 (06-25-23 @ 15:30)  T(F): 97.9 (06-26-23 @ 08:25), Max: 98 (06-25-23 @ 15:30)  HR: 91 (06-26-23 @ 08:25) (77 - 91)  BP: 121/60 (06-26-23 @ 08:25) (109/53 - 121/60)  RR: 18 (06-26-23 @ 08:25) (18 - 18)  SpO2: 98% (06-25-23 @ 15:30) (98% - 98%)                                               LABS:                        15.6   5.44  )-----------( 262      ( 26 Jun 2023 06:33 )             44.3     06-26    145  |  106  |  14  ----------------------------<  105<H>  4.0   |  24  |  0.9    Ca    9.8      26 Jun 2023 06:33  Mg     1.9     06-24    TPro  7.4  /  Alb  5.1  /  TBili  0.9  /  DBili  x   /  AST  16  /  ALT  17  /  AlkPhos  67  06-26 / HBa1C 5.3 / egfr 113 / Glucose 105       Urinalysis Basic - ( 26 Jun 2023 06:33 )    Color: x / Appearance: x / SG: x / pH: x  Gluc: 105 mg/dL / Ketone: x  / Bili: x / Urobili: x   Blood: x / Protein: x / Nitrite: x   Leuk Esterase: x / RBC: x / WBC x   Sq Epi: x / Non Sq Epi: x / Bacteria: x    Urine tox : Negative        Sedimentation Rate, Erythrocyte: 9 mm/Hr (06-26-23 @ 06:33)    Culture - Urine (collected 24 Jun 2023 20:02)  Source: Clean Catch Clean Catch (Midstream)  Final Report (26 Jun 2023 07:42):    No growth    CARDIAC MARKERS ( 24 Jun 2023 20:02 )  x     / <0.01 ng/mL / 84 U/L / x     / x          CT Angio A&P: No CTA evidence of aortic dissection or acute intramural hematoma.  No CT evidence for acute thoracic, abdominal or pelvic pathology.    CT angio chest: no abnormal findings.    MRI Lumbar spine:   1.  No evidence of distal spinal cord compression, cord signal   abnormality or abnormal enhancement.    2.  Mild degenerative changes without significant canal or foraminal   stenosis.    Mri thoracic spine :     1.  No evidence of spinal cord compression, cord signal abnormality or   abnormal enhancement.    2.  Small right paracentral disc herniation at T8-9 without cord   compression.    USG testis : No definite sonographic evidence of acute pathology of the left   testicle/epididymis. Trace left hydrocele, nonspecific.  0.7 cm right epididymal cyst.

## 2023-06-26 NOTE — CONSULT NOTE ADULT - ASSESSMENT
39y/o M with ?pudendal vs ilioinguinal nerve pain     - No acute urologic intervention at this time   - Can consider MRI Pelvis ww/o con, d/w medicine   - Recommend neurology follow up, d/w neuro team  - Discussed with Dr. Pressley .
39 yo M w/o significant PMH with episodic lancinating pain in lower abdomen and bandlike tenderness radiating to his groin/scrotum, associated with syncopal and pre-syncopal episodes. Exam is notable for hyperesthesia of his scrotum, tenderness over spinous processes of L1-2, needs to r/o aortic dissection/aneurism with thoracolumbar radicular syndromes vs spinal processes such as demyelinating conditions or other structural conditions in thoracolumbar area.     Recommendations:   - CT Adb/Pelvis w contrast per discretions of primary team  - MRI thoracic and lumbar spine w and w/o contrast  - please obtain A1c  - please obtain orthostatics  - will follow up once the studies done        The case was discussed w attenging

## 2023-06-27 LAB — ANA TITR SER: NEGATIVE — SIGNIFICANT CHANGE UP

## 2023-06-29 DIAGNOSIS — R55 SYNCOPE AND COLLAPSE: ICD-10-CM

## 2023-06-29 DIAGNOSIS — R10.9 UNSPECIFIED ABDOMINAL PAIN: ICD-10-CM

## 2023-06-29 DIAGNOSIS — G57.92 UNSPECIFIED MONONEUROPATHY OF LEFT LOWER LIMB: ICD-10-CM

## 2023-06-29 DIAGNOSIS — Z91.81 HISTORY OF FALLING: ICD-10-CM

## 2023-06-29 DIAGNOSIS — M51.24 OTHER INTERVERTEBRAL DISC DISPLACEMENT, THORACIC REGION: ICD-10-CM

## 2023-06-29 DIAGNOSIS — R51.9 HEADACHE, UNSPECIFIED: ICD-10-CM

## 2023-06-29 DIAGNOSIS — R53.1 WEAKNESS: ICD-10-CM

## 2023-06-29 LAB
CLINICAL BIOCHEMIST REVIEW: SIGNIFICANT CHANGE UP
CLINICAL BIOCHEMIST REVIEW: SIGNIFICANT CHANGE UP
PORPHYRINS RBC-MCNC: 23 MCG/DL — SIGNIFICANT CHANGE UP
PORPHYRINS SERPL-MCNC: <1 MCG/DL — SIGNIFICANT CHANGE UP
PTP REVIEWED BY: SIGNIFICANT CHANGE UP